# Patient Record
Sex: FEMALE | Race: WHITE | NOT HISPANIC OR LATINO | Employment: FULL TIME | URBAN - METROPOLITAN AREA
[De-identification: names, ages, dates, MRNs, and addresses within clinical notes are randomized per-mention and may not be internally consistent; named-entity substitution may affect disease eponyms.]

---

## 2018-06-12 ENCOUNTER — CONSULT (OUTPATIENT)
Dept: CARDIOLOGY | Facility: CLINIC | Age: 53
End: 2018-06-12

## 2018-06-12 VITALS
WEIGHT: 203.2 LBS | HEART RATE: 72 BPM | BODY MASS INDEX: 38.36 KG/M2 | HEIGHT: 61 IN | DIASTOLIC BLOOD PRESSURE: 76 MMHG | SYSTOLIC BLOOD PRESSURE: 110 MMHG

## 2018-06-12 DIAGNOSIS — R07.89 OTHER CHEST PAIN: Primary | ICD-10-CM

## 2018-06-12 DIAGNOSIS — I10 ESSENTIAL HYPERTENSION: ICD-10-CM

## 2018-06-12 DIAGNOSIS — Z82.49 FAMILY HISTORY OF EARLY CAD: ICD-10-CM

## 2018-06-12 PROBLEM — R07.9 CHEST PAIN: Status: ACTIVE | Noted: 2018-06-12

## 2018-06-12 PROCEDURE — 99244 OFF/OP CNSLTJ NEW/EST MOD 40: CPT | Performed by: INTERNAL MEDICINE

## 2018-06-12 PROCEDURE — 93000 ELECTROCARDIOGRAM COMPLETE: CPT | Performed by: INTERNAL MEDICINE

## 2018-06-12 RX ORDER — ASPIRIN 325 MG
TABLET ORAL DAILY
COMMUNITY
Start: 2018-05-07 | End: 2022-02-09

## 2018-06-12 RX ORDER — LEVOTHYROXINE SODIUM 0.07 MG/1
75 TABLET ORAL DAILY
COMMUNITY

## 2018-06-12 RX ORDER — NITROGLYCERIN 0.4 MG/1
TABLET SUBLINGUAL AS NEEDED
COMMUNITY
Start: 2018-05-07 | End: 2019-06-28 | Stop reason: SDUPTHER

## 2018-06-12 RX ORDER — FLUOXETINE HYDROCHLORIDE 40 MG/1
40 CAPSULE ORAL DAILY
COMMUNITY
Start: 2018-06-05 | End: 2022-02-09

## 2018-06-12 RX ORDER — LISINOPRIL AND HYDROCHLOROTHIAZIDE 12.5; 1 MG/1; MG/1
TABLET ORAL DAILY
COMMUNITY
Start: 2018-06-05 | End: 2019-06-28

## 2018-06-12 NOTE — ASSESSMENT & PLAN NOTE
Patient's symptoms are atypical in nature in that they occur sporadically and without exertion. The fact that they do improve with nitroglycerin raises the possibility of obstructive disease, coronary vasospasm, or esophageal spasm. We discussed her stress test results. She had a small defect in the anterior apical wall which I told her may represent a small territory of ischemia and her myocardium, but also may represent breast attenuation given its location. I have offered the patient heart catheterization for definitive assessment versus continued monitoring and treatment with low-dose aspirin and as needed nitroglycerin. The patient would prefer to defer cardiac catheterization which I think is reasonable.

## 2019-06-25 ENCOUNTER — TELEPHONE (OUTPATIENT)
Dept: CARDIOLOGY | Facility: CLINIC | Age: 54
End: 2019-06-25

## 2019-06-25 NOTE — TELEPHONE ENCOUNTER
Patient called scheduling to report she was at [facility] ER yesterday for chest pain but left AMA. I requested these records. Last seen 6/2018. Will work in clinic this week.

## 2019-06-27 NOTE — PROGRESS NOTES
Encounter Date:06/28/2019    Patient ID: Umu Aburto is a 54 y.o. female who is  and resides in [place], [place]    CC/Reason for visit:  Chest Pain and Abnormal ECG           Problem List Items Addressed This Visit        Cardiovascular and Mediastinum    Angina pectoris (CMS/HCC) - Primary    Overview     · [facility] admission for chest pain with negative biomarkers, 5/6/doesn't 18  · Exercise nuclear stress (5/7/18): Exercise for 5 minutes. Mild to moderate partially reversible mid anterior and apical wall defect. LVEF 63%  · Echo (5/7/18): LVEF 60%. Mild LVH. No significant valvular abnormality  · Presentation to [facility] 6/24/2019 with chest pain and ruled out for AMI/ACS/PE with negative biomarkers, negative CT and nonspecific EKG             Current Assessment & Plan     · Schedule myocardial perfusion study at [facility] with follow-up scheduled on same day  · Continue aspirin 81 mg daily  · Sublingual nitroglycerin for any episodes of angina         Relevant Medications    nitroglycerin (NITROSTAT) 0.4 MG SL tablet    Other Relevant Orders    Stress Test With Myocardial Perfusion (1 Day)    Essential hypertension    Current Assessment & Plan     · Hypertension is controlled  · Continue losartan/hydrochlorthiazide 50/12.5 mg 1 tablet daily         Relevant Medications    losartan-hydrochlorothiazide (HYZAAR) 50-12.5 MG per tablet       Other    Family history of early CAD    Overview     · Calculated 10 year risk of cardiovascular events is <2%         Current Assessment & Plan     · Continue aspirin 81 mg daily  · Defer statin therapy for now             The patient and I discussed proceeding with a cardiac catheterization considering her last stress test showed possible reversible ischemia versus breast attenuation however she prefers a non-invasive conservative route therefore we will schedule a myocardial perfusion study at [facility].  I will send in  sublingual nitroglycerin for her to use.  Her symptoms have typical and atypical features.  I am more concerned about her increased shortness of breath as her dull chest ache episode is very atypical for cardiac.       · Schedule myocardial perfusion study in 2 to 3 weeks at [facility] with follow-up scheduled on same day  · Patient to go to the ER for chest pain unrelieved with nitroglycerin  · If patient develops worsening symptoms prior to her stress test she is to contact us and we can consider proceeding directly to cardiac catheterization  Return in about 3 weeks (around 7/19/2019), or if symptoms worsen or fail to improve.            Umu Aburto returns today for follow-up of her chest pain and cardiac risk factors.  Patient was seen in consultation 1 year ago for evaluation of atypical chest pain.  She underwent a stress test that showed possible reversible ischemia versus breast attenuation.  A cardiac catheterization was discussed at that time but the patient preferred to defer the procedure and be treated medically which was felt to be reasonable as her symptoms were atypical at the time.  Since then the patient was actually at [facility] emergency room 4 days ago with complaints of chest pain.  She had used 3 nitroglycerin prior to her presentation but did not have any relief.  Was ruled out for AMI/ACS with negative biomarkers and EKG with nonspecific ST changes. She underwent a CT of the chest that was negative for pulmonary embolism.  They recommended she be hospitalized due to a high heart score but she declined hospitalization preferred to follow-up in our office.  The patient is an  at [employer] and while working she had sudden onset of substernal dull chest ache.  She had no other associated symptoms other than it seemed to worsen when she took in a deep breath.  She used sublingual nitroglycerin x3 but her symptoms did not improve.  Since  "discharge from the hospital she has had no further symptoms.  She has noticed over the past few months having intermittent shortness of breath particularly when walking up the stairs in her home.  She denies orthopnea, palpitations, presyncope, syncope, diaphoresis, nausea or vomiting.  She is requesting refill of her nitroglycerin.    Review of Systems   Constitution: Negative for weakness and malaise/fatigue.   Eyes: Negative for vision loss in left eye and vision loss in right eye.   Cardiovascular: Negative for chest pain, dyspnea on exertion, near-syncope, orthopnea, palpitations, paroxysmal nocturnal dyspnea and syncope.   Musculoskeletal: Negative for myalgias.   Neurological: Negative for brief paralysis, excessive daytime sleepiness, focal weakness, numbness and paresthesias.   All other systems reviewed and are negative.      The patient's past medical, social, family history and ROS reviewed in the patient's electronic medical record.    Allergies  Patient has no known allergies.    Outpatient Medications Marked as Taking for the 6/28/19 encounter (Office Visit) with Estephanie Durham APRN   Medication Sig Dispense Refill   • amitriptyline (ELAVIL) 25 MG tablet Every Night.     • CODY LOW DOSE 81 MG EC tablet Daily.     • busPIRone (BUSPAR) 10 MG tablet 3 (Three) Times a Day.     • dicyclomine (BENTYL) 20 MG tablet As Needed.  3   • FLUoxetine (PROzac) 40 MG capsule 20 mg Daily.     • hydrOXYzine Pamoate (VISTARIL PO) Take  by mouth Daily.     • levothyroxine (SYNTHROID, LEVOTHROID) 75 MCG tablet Take 75 mcg by mouth Daily.     • losartan-hydrochlorothiazide (HYZAAR) 50-12.5 MG per tablet Daily.     • nitroglycerin (NITROSTAT) 0.4 MG SL tablet Place 1 tablet under the tongue Every 5 (Five) Minutes As Needed (as need for chest pain). 60 tablet 1   • [DISCONTINUED] nitroglycerin (NITROSTAT) 0.4 MG SL tablet As Needed.             Blood pressure 110/64, pulse 81, height 154.9 cm (61\"), weight 104 kg (228 " lb 12.8 oz).  Body mass index is 43.23 kg/m².  There were no vitals filed for this visit.    Physical Exam   Constitutional: She is oriented to person, place, and time. She appears well-developed and well-nourished.   HENT:   Head: Normocephalic and atraumatic.   Eyes: Pupils are equal, round, and reactive to light. No scleral icterus.   Neck: No JVD present. Carotid bruit is not present. No thyromegaly present.   Cardiovascular: Normal rate and regular rhythm. Exam reveals no gallop.   No murmur heard.  Pulmonary/Chest: Effort normal and breath sounds normal.   Abdominal: Soft. She exhibits no distension. There is no hepatosplenomegaly.   Musculoskeletal: She exhibits no edema.   Neurological: She is alert and oriented to person, place, and time.   Skin: Skin is warm and dry.   Psychiatric: She has a normal mood and affect. Her behavior is normal.       Data Review (reviewed with patient):     Procedures    No results found for: CHOL, TRIG, HDL, LDL, AST, ALT    No results found for: HGBA1C        JILL Christian  6/28/2019

## 2019-06-28 ENCOUNTER — OFFICE VISIT (OUTPATIENT)
Dept: CARDIOLOGY | Facility: CLINIC | Age: 54
End: 2019-06-28

## 2019-06-28 VITALS
BODY MASS INDEX: 43.2 KG/M2 | SYSTOLIC BLOOD PRESSURE: 110 MMHG | HEART RATE: 81 BPM | HEIGHT: 61 IN | WEIGHT: 228.8 LBS | DIASTOLIC BLOOD PRESSURE: 64 MMHG

## 2019-06-28 DIAGNOSIS — Z82.49 FAMILY HISTORY OF EARLY CAD: ICD-10-CM

## 2019-06-28 DIAGNOSIS — I10 ESSENTIAL HYPERTENSION: ICD-10-CM

## 2019-06-28 DIAGNOSIS — I20.9 ANGINA PECTORIS (HCC): Primary | ICD-10-CM

## 2019-06-28 PROCEDURE — 99214 OFFICE O/P EST MOD 30 MIN: CPT | Performed by: NURSE PRACTITIONER

## 2019-06-28 RX ORDER — DICYCLOMINE HCL 20 MG
TABLET ORAL AS NEEDED
Refills: 3 | COMMUNITY
Start: 2019-04-01 | End: 2021-03-12 | Stop reason: SDUPTHER

## 2019-06-28 RX ORDER — BUSPIRONE HYDROCHLORIDE 10 MG/1
TABLET ORAL 3 TIMES DAILY
COMMUNITY
Start: 2019-06-21 | End: 2022-02-09 | Stop reason: ALTCHOICE

## 2019-06-28 RX ORDER — AMITRIPTYLINE HYDROCHLORIDE 25 MG/1
50 TABLET, FILM COATED ORAL NIGHTLY
COMMUNITY
Start: 2019-04-10 | End: 2020-07-22

## 2019-06-28 RX ORDER — NITROGLYCERIN 0.4 MG/1
0.4 TABLET SUBLINGUAL
Qty: 60 TABLET | Refills: 1 | Status: SHIPPED | OUTPATIENT
Start: 2019-06-28 | End: 2022-02-09 | Stop reason: SDUPTHER

## 2019-06-28 RX ORDER — LOSARTAN POTASSIUM AND HYDROCHLOROTHIAZIDE 12.5; 5 MG/1; MG/1
TABLET ORAL DAILY
COMMUNITY
Start: 2019-06-21 | End: 2019-07-19

## 2019-06-28 NOTE — ASSESSMENT & PLAN NOTE
· Schedule myocardial perfusion study at [facility] with follow-up scheduled on same day  · Continue aspirin 81 mg daily  · Sublingual nitroglycerin for any episodes of angina

## 2019-07-19 ENCOUNTER — HOSPITAL ENCOUNTER (OUTPATIENT)
Dept: CARDIOLOGY | Facility: HOSPITAL | Age: 54
Discharge: HOME | End: 2019-07-19

## 2019-07-19 ENCOUNTER — OFFICE VISIT (OUTPATIENT)
Dept: CARDIOLOGY | Facility: CLINIC | Age: 54
End: 2019-07-19

## 2019-07-19 VITALS
BODY MASS INDEX: 43.92 KG/M2 | HEIGHT: 61 IN | DIASTOLIC BLOOD PRESSURE: 91 MMHG | SYSTOLIC BLOOD PRESSURE: 131 MMHG | WEIGHT: 232.6 LBS | HEART RATE: 94 BPM

## 2019-07-19 VITALS — HEIGHT: 61 IN | WEIGHT: 228 LBS | BODY MASS INDEX: 43.05 KG/M2

## 2019-07-19 DIAGNOSIS — I10 ESSENTIAL HYPERTENSION: ICD-10-CM

## 2019-07-19 DIAGNOSIS — I20.9 ANGINA PECTORIS (HCC): ICD-10-CM

## 2019-07-19 DIAGNOSIS — Z82.49 FAMILY HISTORY OF EARLY CAD: ICD-10-CM

## 2019-07-19 DIAGNOSIS — I20.9 ANGINA PECTORIS (HCC): Primary | ICD-10-CM

## 2019-07-19 DIAGNOSIS — R06.09 DYSPNEA ON EXERTION: ICD-10-CM

## 2019-07-19 PROCEDURE — 99214 OFFICE O/P EST MOD 30 MIN: CPT | Performed by: NURSE PRACTITIONER

## 2019-07-19 PROCEDURE — A9500 TC99M SESTAMIBI: HCPCS | Performed by: INTERNAL MEDICINE

## 2019-07-19 PROCEDURE — 78452 HT MUSCLE IMAGE SPECT MULT: CPT

## 2019-07-19 PROCEDURE — 78452 HT MUSCLE IMAGE SPECT MULT: CPT | Performed by: INTERNAL MEDICINE

## 2019-07-19 PROCEDURE — 93018 CV STRESS TEST I&R ONLY: CPT | Performed by: INTERNAL MEDICINE

## 2019-07-19 PROCEDURE — 0 TECHNETIUM SESTAMIBI: Performed by: INTERNAL MEDICINE

## 2019-07-19 PROCEDURE — 93017 CV STRESS TEST TRACING ONLY: CPT

## 2019-07-19 RX ORDER — OMEPRAZOLE 40 MG/1
40 CAPSULE, DELAYED RELEASE ORAL DAILY
COMMUNITY
End: 2022-02-09

## 2019-07-19 RX ORDER — LOSARTAN POTASSIUM AND HYDROCHLOROTHIAZIDE 12.5; 5 MG/1; MG/1
1 TABLET ORAL 2 TIMES DAILY
Qty: 90 TABLET | Refills: 3 | Status: SHIPPED | OUTPATIENT
Start: 2019-07-19 | End: 2020-01-30 | Stop reason: SDUPTHER

## 2019-07-19 RX ADMIN — TECHNETIUM TC 99M SESTAMIBI 1 DOSE: 1 INJECTION INTRAVENOUS at 09:50

## 2019-07-19 RX ADMIN — TECHNETIUM TC 99M SESTAMIBI 1 DOSE: 1 INJECTION INTRAVENOUS at 07:45

## 2019-07-19 NOTE — ASSESSMENT & PLAN NOTE
· NYHA class III symptoms likely related to 60 pound weight gain and deconditioning  · Recommend weight loss and increase physical activity.  If dyspnea does not improve with this she can contact us

## 2019-07-19 NOTE — ASSESSMENT & PLAN NOTE
· Hypertension is not well controlled  · Response during stress testing  · Increase losartan/hydrochlorothiazide from daily to twice daily   · Repeat BMP in 1 week

## 2019-07-19 NOTE — PROGRESS NOTES
Encounter Date:07/19/2019    Patient ID: Umu Aburto is a marrried 54 y.o. female who resides in [place], [place]. She is employed at [employer].    CC/Reason for visit:  Chest Pain (follow up with stress test) and Hypertension           Problem List Items Addressed This Visit        Cardiovascular and Mediastinum    Angina pectoris (CMS/HCC) - Primary    Overview     · [facility] admission for chest pain with negative biomarkers, 5/6/doesn't 18  · Exercise nuclear stress (5/7/18): Exercise for 5 minutes. Mild to moderate partially reversible mid anterior and apical wall defect. LVEF 63%  · Echo (5/7/18): LVEF 60%. Mild LVH. No significant valvular abnormality  · Presentation to [facility] 6/24/2019 with chest pain and ruled out for AMI/ACS/PE with negative biomarkers, negative CT and nonspecific EKG  · MPS (7/19/2019): No ischemia.  Low risk study             Current Assessment & Plan     · Patient is not experience any further chest pain  · Can discontinue aspirin         Essential hypertension    Current Assessment & Plan     · Hypertension is not well controlled  · Response during stress testing  · Increase losartan/hydrochlorothiazide from daily to twice daily   · Repeat BMP in 1 week         Relevant Medications    losartan-hydrochlorothiazide (HYZAAR) 50-12.5 MG per tablet    Other Relevant Orders    Basic Metabolic Panel       Respiratory    Dyspnea on exertion    Current Assessment & Plan     · NYHA class III symptoms likely related to 60 pound weight gain and deconditioning  · Recommend weight loss and increase physical activity.  If dyspnea does not improve with this she can contact us            Other    Family history of early CAD    Overview     · Calculated 10 year risk of cardiovascular events is <2%             The patient's stress test was normal and low risk.  I instructed her to discontinue her aspirin.  In regards to her dyspnea on exertion it is  likely related to her weight gain and physical deconditioning as she had a normal echo last year.  We discussed strategies for weight loss including eating a 1800-calorie consistent low carbohydrate daily and increasing physical activity to 30 minutes a day.  If shortness of breath persist despite weight loss and increasing physical activity she is to contact us.  Her blood pressure has also not been well controlled and she had a hypertensive response to stress therefore I will increase her losartan/hydrochlorothiazide 200 mg / 25 mg daily.  She will need a BMP in 1 week.  Follow-up 1 year or sooner if needed       · Increase losartan/hydrochlorothiazide 50 mg / 12.5 mg twice daily  · BMP in 1 week  · Increase physical activity 30 minutes most days of the week  · 1800-calorie restricted consistent low carbohydrate diet for weight loss  · If weight loss and increase physical activity does not improve dyspnea patient can contact us  · Lab results reviewed with patient  Return in about 1 year (around 7/19/2020), or if symptoms worsen or fail to improve.          Umu Aburto returns to my office today for follow-up of her angina and cardiac risk factors, including hypertension and family history of early CAD.  At her last visit the patient had reported chest discomfort that had typical and atypical features and shortness of breath.  Myocardial perfusion study was ordered which she had earlier today.  Her results were normal without evidence of ischemia.  The patient has not had any further chest pain but still experiences dyspnea on exertion.  Whenever she climbs the stairs in her home she is very winded by the time she gets of the top.  She often has to sit on her bed for a few minutes.  She reports having gained 60 pounds over the past year and particularly 20 pounds in the past 2 months she is around the same time she has noticed her breathing has worsened.  She also admits she is not very active and does no real  "forms of physical activity.  Her  reports they have both gained weight from going to [employer] daily and eating blizzards.    Review of Systems   Constitution: Positive for malaise/fatigue and weight gain.   Cardiovascular: Positive for leg swelling.   Respiratory: Positive for shortness of breath and snoring.    Endocrine: Positive for cold intolerance and polydipsia.   Skin: Positive for dry skin.   Musculoskeletal: Positive for arthritis, neck pain and stiffness.   Gastrointestinal: Positive for constipation and hematochezia.       The patient's past medical, social, family history and ROS reviewed in the patient's electronic medical record.    Allergies  Patient has no known allergies.    Outpatient Medications Marked as Taking for the 7/19/19 encounter (Office Visit) with Arya Rutledge IV, MD   Medication Sig Dispense Refill   • amitriptyline (ELAVIL) 25 MG tablet 50 mg Every Night.     • B Complex Vitamins (VITAMIN B COMPLEX PO) Take  by mouth Daily.     • CODY LOW DOSE 81 MG EC tablet Daily.     • busPIRone (BUSPAR) 10 MG tablet 3 (Three) Times a Day.     • dicyclomine (BENTYL) 20 MG tablet As Needed.  3   • FLUoxetine (PROzac) 40 MG capsule 20 mg Daily.     • levothyroxine (SYNTHROID, LEVOTHROID) 75 MCG tablet Take 75 mcg by mouth Daily.     • losartan-hydrochlorothiazide (HYZAAR) 50-12.5 MG per tablet Take 1 tablet by mouth 2 (Two) Times a Day. 90 tablet 3   • Multiple Vitamins-Minerals (VITAMIN D3 COMPLETE PO) Take  by mouth Daily.     • omeprazole (priLOSEC) 40 MG capsule Take 40 mg by mouth Daily.     • [DISCONTINUED] losartan-hydrochlorothiazide (HYZAAR) 50-12.5 MG per tablet Daily.             Blood pressure 131/91, pulse 94, height 154.9 cm (61\"), weight 106 kg (232 lb 9.6 oz).  Body mass index is 43.95 kg/m².  Vitals:    07/19/19 1211   Patient Position: Sitting       Physical Exam   Constitutional: She is oriented to person, place, and time. She appears well-developed and " well-nourished.   HENT:   Head: Normocephalic and atraumatic.   Eyes: Pupils are equal, round, and reactive to light. No scleral icterus.   Neck: No JVD present. Carotid bruit is not present. No thyromegaly present.   Cardiovascular: Normal rate and regular rhythm. Exam reveals no gallop.   No murmur heard.  Pulmonary/Chest: Effort normal and breath sounds normal.   Abdominal: Soft. She exhibits no distension. There is no hepatosplenomegaly.   Musculoskeletal: She exhibits no edema.   Neurological: She is alert and oriented to person, place, and time.   Skin: Skin is warm and dry.   Psychiatric: She has a normal mood and affect. Her behavior is normal.       Data Review:     Procedures    Last labs, 06/24/2019:  · CBC: WBC 11.5, RBC 4.39, Hgb 13.7, Hct 40.6, MCV 92.4, MCH 31.2, RDW 12.9, Plt 386, MPV 9.3  · CMP (6/24/2019): Na 137, K 3.8, Cl 98, CO2 27, BUN 16.2, crat 0.8, eGFR > 59, Glucose 157, Ca 9.1, AST 33, ALT 65, TP 7.3, Alb 4.0, Alk Phos 114l          JILL Christian

## 2019-07-25 LAB
BH CV NUCLEAR PRIOR STUDY: 1
BH CV STRESS BP STAGE 1: NORMAL
BH CV STRESS BP STAGE 2: NORMAL
BH CV STRESS DURATION MIN STAGE 1: 3
BH CV STRESS DURATION MIN STAGE 2: 2
BH CV STRESS DURATION SEC STAGE 1: 0
BH CV STRESS DURATION SEC STAGE 2: 0
BH CV STRESS GRADE STAGE 1: 10
BH CV STRESS GRADE STAGE 2: 12
BH CV STRESS HR STAGE 1: 143
BH CV STRESS HR STAGE 2: 158
BH CV STRESS METS STAGE 1: 5
BH CV STRESS METS STAGE 2: 6.4
BH CV STRESS O2 STAGE 1: 97
BH CV STRESS O2 STAGE 2: 98
BH CV STRESS PROTOCOL 1: NORMAL
BH CV STRESS RECOVERY BP: NORMAL MMHG
BH CV STRESS RECOVERY HR: 109 BPM
BH CV STRESS RECOVERY O2: 98 %
BH CV STRESS SPEED STAGE 1: 1.7
BH CV STRESS SPEED STAGE 2: 2.5
BH CV STRESS STAGE 1: 1
BH CV STRESS STAGE 2: 2
LV EF NUC BP: 67 %
MAXIMAL PREDICTED HEART RATE: 167 BPM
PERCENT MAX PREDICTED HR: 95.21 %
STRESS BASELINE BP: NORMAL MMHG
STRESS BASELINE HR: 94 BPM
STRESS O2 SAT REST: 97 %
STRESS PERCENT HR: 112 %
STRESS POST ESTIMATED WORKLOAD: 6.4 METS
STRESS POST EXERCISE DUR MIN: 5 MIN
STRESS POST EXERCISE DUR SEC: 0 SEC
STRESS POST O2 SAT PEAK: 95 %
STRESS POST PEAK BP: NORMAL MMHG
STRESS POST PEAK HR: 159 BPM
STRESS TARGET HR: 142 BPM

## 2019-08-14 ENCOUNTER — TELEPHONE (OUTPATIENT)
Dept: CARDIOLOGY | Facility: CLINIC | Age: 54
End: 2019-08-14

## 2019-08-14 NOTE — TELEPHONE ENCOUNTER
"Patient called to report that since her Losartan-HCTZ was increased she has been experiencing some leg cramps that feel like a \"dull ache\". Patient was due for recheck of BMP one week after initiation of new dose. Pt states that she got these drawn and will have the results fax to us. Patient advised that we will review labs and call with any recommendations. Patient is agreeable to plan.   "

## 2019-08-16 NOTE — TELEPHONE ENCOUNTER
"Labs scanned under \"Lab\" tab in chart.    I called patient today to check in on symptoms. Pt reports still having dull ache in legs. Patient denies any chest pain, edema, palpitations, SOA. Her BP is averaging 124/74 HR 91.       Her dose of Losartan-HCTZ 50mg-12.5 mg daily was increased the end of July to twice daily. She contributes her leg aches to this.       How would you like to proceed?  "

## 2019-08-16 NOTE — TELEPHONE ENCOUNTER
Patient contacted with recommendations per JWL. Pt advised to increase water intake and remain active as tolerated. Pt to call office if leg discomfort persists. Pt agreeable to plan, verbalizes understanding. All questions answered at this time.

## 2019-08-16 NOTE — TELEPHONE ENCOUNTER
Labs are normal, should not be the cause of her leg discomfort, but if it persists then would be reasonable to decrease back to previous dose and trend symptoms and BP.

## 2020-01-30 RX ORDER — LOSARTAN POTASSIUM 50 MG/1
50 TABLET ORAL 2 TIMES DAILY
Qty: 180 TABLET | Refills: 1 | Status: SHIPPED | OUTPATIENT
Start: 2020-01-30 | End: 2020-07-22 | Stop reason: SDUPTHER

## 2020-01-30 RX ORDER — HYDROCHLOROTHIAZIDE 12.5 MG/1
12.5 CAPSULE, GELATIN COATED ORAL 2 TIMES DAILY
Qty: 180 CAPSULE | Refills: 1 | Status: SHIPPED | OUTPATIENT
Start: 2020-01-30 | End: 2020-07-22 | Stop reason: SDUPTHER

## 2020-07-22 ENCOUNTER — OFFICE VISIT (OUTPATIENT)
Dept: CARDIOLOGY | Facility: CLINIC | Age: 55
End: 2020-07-22

## 2020-07-22 VITALS
HEIGHT: 61 IN | SYSTOLIC BLOOD PRESSURE: 126 MMHG | WEIGHT: 212.6 LBS | OXYGEN SATURATION: 98 % | TEMPERATURE: 96.9 F | DIASTOLIC BLOOD PRESSURE: 88 MMHG | BODY MASS INDEX: 40.14 KG/M2 | HEART RATE: 97 BPM

## 2020-07-22 DIAGNOSIS — Z82.49 FAMILY HISTORY OF EARLY CAD: ICD-10-CM

## 2020-07-22 DIAGNOSIS — I20.8 ATYPICAL ANGINA (HCC): Primary | ICD-10-CM

## 2020-07-22 DIAGNOSIS — I10 ESSENTIAL HYPERTENSION: ICD-10-CM

## 2020-07-22 PROBLEM — I20.89 ATYPICAL ANGINA: Status: ACTIVE | Noted: 2018-06-12

## 2020-07-22 PROCEDURE — 99213 OFFICE O/P EST LOW 20 MIN: CPT | Performed by: INTERNAL MEDICINE

## 2020-07-22 PROCEDURE — 93000 ELECTROCARDIOGRAM COMPLETE: CPT | Performed by: INTERNAL MEDICINE

## 2020-07-22 RX ORDER — CHOLECALCIFEROL (VITAMIN D3) 125 MCG
30 CAPSULE ORAL
COMMUNITY
End: 2022-02-09

## 2020-07-22 RX ORDER — OMEGA-3S/DHA/EPA/FISH OIL/D3 300MG-1000
400 CAPSULE ORAL DAILY
COMMUNITY

## 2020-07-22 RX ORDER — LOSARTAN POTASSIUM 100 MG/1
100 TABLET ORAL DAILY
Qty: 90 TABLET | Refills: 3 | Status: SHIPPED | OUTPATIENT
Start: 2020-07-22 | End: 2021-08-30

## 2020-07-22 RX ORDER — CYCLOBENZAPRINE HCL 10 MG
10 TABLET ORAL NIGHTLY
COMMUNITY
End: 2022-02-09 | Stop reason: ALTCHOICE

## 2020-07-22 RX ORDER — HYDROCHLOROTHIAZIDE 12.5 MG/1
25 CAPSULE, GELATIN COATED ORAL EVERY MORNING
Qty: 180 CAPSULE | Refills: 1 | Status: SHIPPED | OUTPATIENT
Start: 2020-07-22 | End: 2020-09-16

## 2020-07-22 NOTE — ASSESSMENT & PLAN NOTE
"· Patient concerned about family history of coronary artery disease but also does not want to take statin therapy unless \"needed\"  · Recommend repeat CMP and lipid  · Recommend coronary calcium score testing for advanced re-stratification  · If CAC >100, statin therapy indicated.  If CAC >1, statin therapy reasonable.  "

## 2020-07-22 NOTE — PROGRESS NOTES
[facility]  Office Visit Note    DATE: 07/22/2020    IDENTIFICATION: Umu Aburto is a  55 y.o. female who resides in [place], [place].    REASON FOR VISIT:  • Angina  • Hypertension             Umu Aburto returns to the office for 1 year follow-up.  Patient reports that she had an episode of chest pain recently while eating peanuts.  After washing a peanut down with soda, she had acute onset of midsternal severe chest pain which lasted for approximately 20 minutes and then resolved spontaneously.  Patient denies having chest pain symptoms when she physically exerts herself.    Review of Systems   Cardiovascular: Positive for chest pain.   All other systems reviewed and are negative.      The patient's past medical, social, family history and ROS reviewed in the patient's electronic medical record.    No Known Allergies      Current Outpatient Medications:   •  B Complex Vitamins (VITAMIN B COMPLEX PO), Take  by mouth Daily., Disp: , Rfl:   •  CODY LOW DOSE 81 MG EC tablet, Daily., Disp: , Rfl:   •  busPIRone (BUSPAR) 10 MG tablet, 3 (Three) Times a Day., Disp: , Rfl:   •  cholecalciferol (VITAMIN D3) 10 MCG (400 UNIT) tablet, Take 400 Units by mouth Daily., Disp: , Rfl:   •  cyclobenzaprine (FLEXERIL) 10 MG tablet, Take 10 mg by mouth Every Night., Disp: , Rfl:   •  DICLOFENAC PO, Take 75 mg by mouth 2 (Two) Times a Day., Disp: , Rfl:   •  dicyclomine (BENTYL) 20 MG tablet, As Needed., Disp: , Rfl: 3  •  FLUoxetine (PROzac) 40 MG capsule, 40 mg Daily., Disp: , Rfl:   •  hydroCHLOROthiazide (MICROZIDE) 12.5 MG capsule, Take 2 capsules by mouth Every Morning., Disp: 180 capsule, Rfl: 1  •  levothyroxine (SYNTHROID, LEVOTHROID) 75 MCG tablet, Take 75 mcg by mouth Daily., Disp: , Rfl:   •  losartan (COZAAR) 100 MG tablet, Take 1 tablet by mouth Daily., Disp: 90 tablet, Rfl: 3  •  melatonin 5 MG tablet tablet, Take 30 mg by mouth. 3 tabs at night, Disp: , Rfl:   •  metFORMIN  "(GLUCOPHAGE) 500 MG tablet, Take 500 mg by mouth 2 (Two) Times a Day With Meals., Disp: , Rfl:   •  nitroglycerin (NITROSTAT) 0.4 MG SL tablet, Place 1 tablet under the tongue Every 5 (Five) Minutes As Needed (as need for chest pain)., Disp: 60 tablet, Rfl: 1  •  omeprazole (priLOSEC) 40 MG capsule, Take 40 mg by mouth Daily., Disp: , Rfl:     Past Medical History:   Diagnosis Date   • Anxiety    • Arthritis    • Colitis    • Depression    • Hypertension    • Meningitis    • Thyroid disorder        Past Surgical History:   Procedure Laterality Date   •  SECTION     • CHOLECYSTECTOMY     • COLONOSCOPY W/ POLYPECTOMY         Family History   Problem Relation Age of Onset   • Parkinsonism Mother    • Pneumonia Mother    • Heart disease Father    • Heart murmur Sister    • Diabetes Brother    • Heart disease Brother    • Heart disease Brother        Social History     Tobacco Use   • Smoking status: Never Smoker   • Smokeless tobacco: Never Used   Substance Use Topics   • Alcohol use: No           Blood pressure 126/88, pulse 97, temperature 96.9 °F (36.1 °C), height 154.9 cm (61\"), weight 96.4 kg (212 lb 9.6 oz), SpO2 98 %.  Body mass index is 40.17 kg/m².  Vitals:    20 1318   Patient Position: Sitting       Physical Exam   Constitutional: She is oriented to person, place, and time. She appears well-developed and well-nourished.   HENT:   Head: Normocephalic and atraumatic.   Eyes: Pupils are equal, round, and reactive to light. No scleral icterus.   Neck: No JVD present. Carotid bruit is not present. No thyromegaly present.   Cardiovascular: Normal rate, regular rhythm, S1 normal and S2 normal. Exam reveals no gallop.   No murmur heard.  Pulmonary/Chest: Effort normal and breath sounds normal.   Abdominal: Soft. She exhibits no mass. There is no hepatosplenomegaly. There is no tenderness.   Neurological: She is alert and oriented to person, place, and time.   Skin: Skin is warm and dry. No cyanosis. " Nails show no clubbing.   Psychiatric: She has a normal mood and affect. Her behavior is normal.       Data Review (reviewed with patient):       ECG 12 Lead  Date/Time: 7/22/2020 1:33 PM  Performed by: Arya Rutledge IV, MD  Authorized by: Arya Rutledge IV, MD   Comparison: compared with previous ECG from 6/12/2018  Similar to previous ECG  Rhythm: sinus rhythm  BPM: 95    Clinical impression: normal ECG                   Problem List Items Addressed This Visit        Cardiology Problems    Atypical angina (CMS/HCC) - Primary    Overview     · [facility] admission for chest pain with negative biomarkers, 5/6/2018  · Exercise nuclear stress (5/7/18): Exercise for 5 minutes. Mild to moderate partially reversible mid anterior and apical wall defect. LVEF 63%  · Echo (5/7/18): LVEF 60%. Mild LVH. No significant valvular abnormality  · Presentation to [facility] 6/24/2019 with chest pain and ruled out for AMI/ACS/PE with negative biomarkers, negative CT and nonspecific EKG  · Nuclear stress test (7/19/2019): No ischemia/infarct.         Current Assessment & Plan     · Recent nonexertional chest pain symptoms are atypical and unlikely related to obstructive CAD  · Recent stress testing within normal limits  · Reassured patient this episode likely GI in nature         Relevant Orders    ECG 12 Lead    CBC (No Diff)    Essential hypertension    Overview     • Target blood pressure <130/80 mmHg         Current Assessment & Plan     · Controlled  · Continue present medical therapy         Relevant Medications    losartan (COZAAR) 100 MG tablet    hydroCHLOROthiazide (MICROZIDE) 12.5 MG capsule       Other    Family history of early CAD    Overview     · Dad with CABG at 50  · Calculated 10 year risk of cardiovascular events is <2%         Current Assessment & Plan     · Patient concerned about family history of coronary artery disease but also does not want to take statin  "therapy unless \"needed\"  · Recommend repeat CMP and lipid  · Recommend coronary calcium score testing for advanced re-stratification  · If CAC >100, statin therapy indicated.  If CAC >1, statin therapy reasonable.         Relevant Orders    CT Cardiac Calcium Score Without Dye    Comprehensive Metabolic Panel    Lipid Panel               · Coronary calcium score testing for advanced risk stratification  · If CAC >100, will recommend statin therapy  Return in about 1 year (around 7/22/2021).      OLGA Rutledge MD [facility], [facility]  Interventional and General Cardiology      7/22/2020    "

## 2020-07-22 NOTE — ASSESSMENT & PLAN NOTE
· Recent nonexertional chest pain symptoms are atypical and unlikely related to obstructive CAD  · Recent stress testing within normal limits  · Reassured patient this episode likely GI in nature

## 2020-07-23 LAB
ALBUMIN SERPL-MCNC: 4.7 G/DL (ref 3.5–5.2)
ALBUMIN/GLOB SERPL: 1.5 G/DL
ALP SERPL-CCNC: 114 U/L (ref 39–117)
ALT SERPL-CCNC: 30 U/L (ref 1–33)
AST SERPL-CCNC: 19 U/L (ref 1–32)
BILIRUB SERPL-MCNC: 0.3 MG/DL (ref 0–1.2)
BUN SERPL-MCNC: 11 MG/DL (ref 6–20)
BUN/CREAT SERPL: 14.3 (ref 7–25)
CALCIUM SERPL-MCNC: 10.3 MG/DL (ref 8.6–10.5)
CHLORIDE SERPL-SCNC: 98 MMOL/L (ref 98–107)
CHOLEST SERPL-MCNC: 214 MG/DL (ref 0–200)
CO2 SERPL-SCNC: 30.5 MMOL/L (ref 22–29)
CREAT SERPL-MCNC: 0.77 MG/DL (ref 0.57–1)
ERYTHROCYTE [DISTWIDTH] IN BLOOD BY AUTOMATED COUNT: 12.1 % (ref 12.3–15.4)
GLOBULIN SER CALC-MCNC: 3.2 GM/DL
GLUCOSE SERPL-MCNC: 94 MG/DL (ref 65–99)
HCT VFR BLD AUTO: 42.6 % (ref 34–46.6)
HDLC SERPL-MCNC: 53 MG/DL (ref 40–60)
HGB BLD-MCNC: 14.4 G/DL (ref 12–15.9)
LDLC SERPL CALC-MCNC: 131 MG/DL (ref 0–100)
MCH RBC QN AUTO: 31.4 PG (ref 26.6–33)
MCHC RBC AUTO-ENTMCNC: 33.8 G/DL (ref 31.5–35.7)
MCV RBC AUTO: 92.8 FL (ref 79–97)
PLATELET # BLD AUTO: 455 10*3/MM3 (ref 140–450)
POTASSIUM SERPL-SCNC: 4.4 MMOL/L (ref 3.5–5.2)
PROT SERPL-MCNC: 7.9 G/DL (ref 6–8.5)
RBC # BLD AUTO: 4.59 10*6/MM3 (ref 3.77–5.28)
SODIUM SERPL-SCNC: 142 MMOL/L (ref 136–145)
TRIGL SERPL-MCNC: 151 MG/DL (ref 0–150)
VLDLC SERPL CALC-MCNC: 30.2 MG/DL
WBC # BLD AUTO: 8.95 10*3/MM3 (ref 3.4–10.8)

## 2020-08-18 ENCOUNTER — TELEPHONE (OUTPATIENT)
Dept: CARDIOLOGY | Facility: CLINIC | Age: 55
End: 2020-08-18

## 2020-08-18 RX ORDER — METOPROLOL TARTRATE 50 MG/1
50 TABLET, FILM COATED ORAL 2 TIMES DAILY
Qty: 60 TABLET | Refills: 0 | Status: SHIPPED | OUTPATIENT
Start: 2020-08-18 | End: 2020-12-15 | Stop reason: SDUPTHER

## 2020-08-18 NOTE — TELEPHONE ENCOUNTER
Patient called to report that she attempted to get her coronary calcium test today but they were unable to preform d/t her HR not going below 90. She is wondering is she could go on a beta blocker for at least a couple of days so she could obtain testing?

## 2020-09-11 ENCOUNTER — TELEPHONE (OUTPATIENT)
Dept: CARDIOLOGY | Facility: CLINIC | Age: 55
End: 2020-09-11

## 2020-09-11 DIAGNOSIS — E78.5 HYPERLIPIDEMIA LDL GOAL <70: Primary | ICD-10-CM

## 2020-09-11 RX ORDER — ATORVASTATIN CALCIUM 20 MG/1
20 TABLET, FILM COATED ORAL DAILY
Qty: 90 TABLET | Refills: 0 | Status: SHIPPED | OUTPATIENT
Start: 2020-09-11 | End: 2020-09-16 | Stop reason: SDUPTHER

## 2020-09-16 DIAGNOSIS — I10 ESSENTIAL HYPERTENSION: ICD-10-CM

## 2020-09-16 DIAGNOSIS — E78.5 HYPERLIPIDEMIA LDL GOAL <100: Primary | ICD-10-CM

## 2020-09-16 RX ORDER — ATORVASTATIN CALCIUM 20 MG/1
20 TABLET, FILM COATED ORAL DAILY
Qty: 90 TABLET | Refills: 1 | Status: SHIPPED | OUTPATIENT
Start: 2020-09-16 | End: 2020-12-15 | Stop reason: SDUPTHER

## 2020-09-16 RX ORDER — HYDROCHLOROTHIAZIDE 25 MG/1
25 TABLET ORAL DAILY
Qty: 90 TABLET | Refills: 3 | Status: SHIPPED | OUTPATIENT
Start: 2020-09-16 | End: 2021-09-30 | Stop reason: SDUPTHER

## 2020-12-15 PROBLEM — E78.5 HYPERLIPIDEMIA LDL GOAL <100: Status: ACTIVE | Noted: 2020-12-15

## 2020-12-15 PROBLEM — I25.119 CORONARY ARTERY DISEASE INVOLVING NATIVE CORONARY ARTERY OF NATIVE HEART WITH ANGINA PECTORIS: Status: ACTIVE | Noted: 2018-06-12

## 2021-01-12 ENCOUNTER — TELEPHONE (OUTPATIENT)
Dept: CARDIOLOGY | Facility: CLINIC | Age: 56
End: 2021-01-12

## 2021-01-12 DIAGNOSIS — E78.5 HYPERLIPIDEMIA LDL GOAL <100: ICD-10-CM

## 2021-01-12 RX ORDER — ATORVASTATIN CALCIUM 20 MG/1
20 TABLET, FILM COATED ORAL NIGHTLY
Qty: 90 TABLET | Refills: 3 | Status: SHIPPED | OUTPATIENT
Start: 2021-01-12 | End: 2022-02-09 | Stop reason: ALTCHOICE

## 2021-01-12 NOTE — ADDENDUM NOTE
Addended by: SHERRILL ARRIAZA IV on: 1/12/2021 02:46 PM     Modules accepted: Orders     [Alert] : alert [No Acute Distress] : no acute distress [Normocephalic] : normocephalic [Anterior [place] Closed] : anterior fontanelle closed [Red Reflex Bilateral] : red reflex bilateral [PERRL] : PERRL [Normally Placed Ears] : normally placed ears [Auricles Well Formed] : auricles well formed [Clear Tympanic membranes with present light reflex and bony landmarks] : clear tympanic membranes with present light reflex and bony landmarks [No Discharge] : no discharge [Nares Patent] : nares patent [Palate Intact] : palate intact [Uvula Midline] : uvula midline [Tooth Eruption] : tooth eruption  [Supple, full passive range of motion] : supple, full passive range of motion [No Palpable Masses] : no palpable masses [Symmetric Chest Rise] : symmetric chest rise [Clear to Ausculatation Bilaterally] : clear to auscultation bilaterally [Regular Rate and Rhythm] : regular rate and rhythm [S1, S2 present] : S1, S2 present [No Murmurs] : no murmurs [+2 Femoral Pulses] : +2 femoral pulses [Soft] : soft [NonTender] : non tender [Non Distended] : non distended [Normoactive Bowel Sounds] : normoactive bowel sounds [No Hepatomegaly] : no hepatomegaly [No Splenomegaly] : no splenomegaly [Juan 1] : Juan 1 [Central Urethral Opening] : central urethral opening [Testicles Descended Bilaterally] : testicles descended bilaterally [Patent] : patent [Normally Placed] : normally placed [No Abnormal Lymph Nodes Palpated] : no abnormal lymph nodes palpated [No Clavicular Crepitus] : no clavicular crepitus [Symmetric Buttocks Creases] : symmetric buttocks creases [No Spinal Dimple] : no spinal dimple [NoTuft of Hair] : no tuft of hair [Cranial Nerves Grossly Intact] : cranial nerves grossly intact [No Rash or Lesions] : no rash or lesions

## 2021-01-12 NOTE — TELEPHONE ENCOUNTER
Patient is unable to tolerate increase in Atorvastatin dose of 40 mg due to myalgias which the patient states is mainly at night. Patient previously tolerate Atorvastatin 20 mg.     Please advise.Thanks.     Lab Results   Component Value Date    CHLPL 214 (H) 07/22/2020    TRIG 151 (H) 07/22/2020    HDL 53 07/22/2020     (H) 07/22/2020     Lab Results   Component Value Date    BUN 11 07/22/2020    CREATININE 0.77 07/22/2020    EGFRIFNONA 78 07/22/2020    EGFRIFAFRI 95 07/22/2020    BCR 14.3 07/22/2020    K 4.4 07/22/2020    CO2 30.5 (H) 07/22/2020    CALCIUM 10.3 07/22/2020    PROTENTOTREF 7.9 07/22/2020    ALBUMIN 4.70 07/22/2020    LABIL2 1.5 07/22/2020    AST 19 07/22/2020    ALT 30 07/22/2020

## 2021-01-12 NOTE — TELEPHONE ENCOUNTER
I think that is just fine.  Other option would be to switch to rosuvastatin but with her CAC score of only 84, I do not think we need to get crazy.    OLGA Rutledge MD [facility], [facility]  Interventional and General Cardiology

## 2021-03-12 ENCOUNTER — OFFICE VISIT (OUTPATIENT)
Dept: OBSTETRICS AND GYNECOLOGY | Facility: CLINIC | Age: 56
End: 2021-03-12

## 2021-03-12 VITALS
SYSTOLIC BLOOD PRESSURE: 140 MMHG | BODY MASS INDEX: 42.67 KG/M2 | DIASTOLIC BLOOD PRESSURE: 82 MMHG | WEIGHT: 226 LBS | HEIGHT: 61 IN

## 2021-03-12 DIAGNOSIS — E66.01 MORBIDLY OBESE (HCC): ICD-10-CM

## 2021-03-12 DIAGNOSIS — Z01.419 WOMEN'S ANNUAL ROUTINE GYNECOLOGICAL EXAMINATION: Primary | ICD-10-CM

## 2021-03-12 DIAGNOSIS — N80.9 ENDOMETRIOSIS: ICD-10-CM

## 2021-03-12 PROCEDURE — 99213 OFFICE O/P EST LOW 20 MIN: CPT | Performed by: OBSTETRICS & GYNECOLOGY

## 2021-03-12 PROCEDURE — 99396 PREV VISIT EST AGE 40-64: CPT | Performed by: OBSTETRICS & GYNECOLOGY

## 2021-03-12 RX ORDER — DICYCLOMINE HCL 20 MG
20 TABLET ORAL AS NEEDED
Qty: 30 TABLET | Refills: 5 | Status: SHIPPED | OUTPATIENT
Start: 2021-03-12 | End: 2022-02-09

## 2021-03-17 DIAGNOSIS — Z01.419 WOMEN'S ANNUAL ROUTINE GYNECOLOGICAL EXAMINATION: ICD-10-CM

## 2021-04-02 ENCOUNTER — APPOINTMENT (OUTPATIENT)
Dept: OTHER | Facility: HOSPITAL | Age: 56
End: 2021-04-02

## 2021-04-02 ENCOUNTER — HOSPITAL ENCOUNTER (OUTPATIENT)
Dept: MAMMOGRAPHY | Facility: HOSPITAL | Age: 56
Discharge: HOME | End: 2021-04-02
Admitting: OBSTETRICS & GYNECOLOGY

## 2021-04-02 DIAGNOSIS — Z01.419 WOMEN'S ANNUAL ROUTINE GYNECOLOGICAL EXAMINATION: ICD-10-CM

## 2021-04-02 DIAGNOSIS — Z92.89 H/O MAMMOGRAM: ICD-10-CM

## 2021-04-02 PROCEDURE — 77067 SCR MAMMO BI INCL CAD: CPT

## 2021-04-02 PROCEDURE — 77063 BREAST TOMOSYNTHESIS BI: CPT

## 2021-04-09 ENCOUNTER — OFFICE VISIT (OUTPATIENT)
Dept: OBSTETRICS AND GYNECOLOGY | Facility: CLINIC | Age: 56
End: 2021-04-09

## 2021-04-09 VITALS
HEIGHT: 61 IN | BODY MASS INDEX: 41.12 KG/M2 | WEIGHT: 217.8 LBS | DIASTOLIC BLOOD PRESSURE: 84 MMHG | SYSTOLIC BLOOD PRESSURE: 132 MMHG

## 2021-04-09 DIAGNOSIS — N80.9 ENDOMETRIOSIS: ICD-10-CM

## 2021-04-09 PROCEDURE — 76830 TRANSVAGINAL US NON-OB: CPT | Performed by: OBSTETRICS & GYNECOLOGY

## 2021-04-09 PROCEDURE — 99213 OFFICE O/P EST LOW 20 MIN: CPT | Performed by: OBSTETRICS & GYNECOLOGY

## 2021-04-09 NOTE — PROGRESS NOTES
Chief Complaint   Patient presents with   • Gynecologic Exam   • Pelvic Pain     follow up       Subjective   HPI  Umu Aburto is a 55 y.o. female, , who presents for pelvic pain follow up, transvaginal ultrasound.      US performed today and normal results. These findings discussed and all questions answered.     The patient reports additional symptoms as none.    Since we last spoke, she is only intermittently having LLQ pelvic pain which she states resolves with dicyclomine.     Her last LMP was No LMP recorded. Patient is postmenopausal..  Periods are absent. Dysmenorrhea:mild, occurring randomly.  Patient reports problems with: none.  Partner Status: Marital Status: .  New Partners since last visit: no.  Desires STD Screening: no.    Additional OB/GYN History   Current contraception: contraceptive methods: None  Desires to: do not start contraception  Last Pap :   Last Completed Pap Smear       Status Date      PAP SMEAR Done 3/17/2021 PAP IG, HPV-HR ([facility])     Patient has more history with this topic...        History of abnormal Pap smear: no  Last mammogram: 2021 - results pending  Last Completed Mammogram       Status Date      MAMMOGRAM Done 3/15/2019 normal per pt        Tobacco Usage?: No   OB History        4    Para   4    Term   2            AB        Living   2       SAB        TAB        Ectopic        Molar        Multiple        Live Births   2                Health Maintenance   Topic Date Due   • ANNUAL PHYSICAL  Never done   • COVID-19 Vaccine (1) Never done   • TDAP/TD VACCINES (1 - Tdap) Never done   • ZOSTER VACCINE (1 of 2) Never done   • HEPATITIS C SCREENING  Never done   • MAMMOGRAM  03/15/2021   • LIPID PANEL  2021   • INFLUENZA VACCINE  2021   • Annual Gynecologic Pelvic and Breast Exam  2022   • PAP SMEAR  2024   • COLONOSCOPY  2029   • Pneumococcal Vaccine 0-64  Aged Out   • MENINGOCOCCAL VACCINE  Aged Out       The  "additional following portions of the patient's history were reviewed and updated as appropriate: allergies, current medications, past family history, past medical history, past social history, past surgical history and problem list.    Review of Systems   Constitutional: Negative for activity change, appetite change, chills, diaphoresis, fatigue, fever, unexpected weight gain and unexpected weight loss.   Respiratory: Negative for cough and shortness of breath.    Cardiovascular: Negative for chest pain and palpitations.   Gastrointestinal: Negative for abdominal distention, abdominal pain, vomiting and indigestion.   Genitourinary: Negative for menstrual problem and pelvic pain.   Neurological: Negative for light-headedness and headache.   Psychiatric/Behavioral: Negative for agitation, behavioral problems, decreased concentration and depressed mood.       I have reviewed and agree with the HPI, ROS, and historical information as entered above. Harley Petit MD    Objective   /84   Ht 154.9 cm (61\")   Wt 98.8 kg (217 lb 12.8 oz)   Breastfeeding No   BMI 41.15 kg/m²     Physical Exam  Vitals reviewed.   HENT:      Head: Normocephalic and atraumatic.   Cardiovascular:      Rate and Rhythm: Normal rate and regular rhythm.   Pulmonary:      Effort: Pulmonary effort is normal.      Breath sounds: Normal breath sounds.   Abdominal:      General: Abdomen is flat. Bowel sounds are normal.      Palpations: Abdomen is soft.   Skin:     General: Skin is warm and dry.   Neurological:      Mental Status: She is alert and oriented to person, place, and time.   Psychiatric:         Mood and Affect: Mood normal.         Behavior: Behavior normal.         Assessment/Plan     Assessment     Problem List Items Addressed This Visit        Genitourinary and Reproductive     Endometriosis            Plan     Return in about 1 year (around 4/9/2022) for Annual physical.  2.  Will continue dicyclomine as needed for pain. " If worsens, will reevaluate.       Harley Petit MD  04/09/2021

## 2021-04-09 NOTE — PATIENT INSTRUCTIONS
Pelvic Pain, Female  Pelvic pain is pain in your lower abdomen, below your belly button and between your hips. The pain may start suddenly (be acute), keep coming back (be recurring), or last a long time (become chronic). Pelvic pain that lasts longer than 6 months is considered chronic.  Pelvic pain may affect your:  · Reproductive organs.  · Urinary system.  · Digestive tract.  · Musculoskeletal system.  There are many potential causes of pelvic pain. Sometimes, the pain can be a result of digestive or urinary conditions, strained muscles or ligaments, or reproductive conditions. Sometimes the cause of pelvic pain is not known.  Follow these instructions at home:    · Take over-the-counter and prescription medicines only as told by your health care provider.  · Rest as told by your health care provider.  · Do not have sex if it hurts.  · Keep a journal of your pelvic pain. Write down:  ? When the pain started.  ? Where the pain is located.  ? What seems to make the pain better or worse, such as food or your period (menstrual cycle).  ? Any symptoms you have along with the pain.  · Keep all follow-up visits as told by your health care provider. This is important.  Contact a health care provider if:  · Medicine does not help your pain.  · Your pain comes back.  · You have new symptoms.  · You have abnormal vaginal discharge or bleeding, including bleeding after menopause.  · You have a fever or chills.  · You are constipated.  · You have blood in your urine or stool.  · You have foul-smelling urine.  · You feel weak or light-headed.  Get help right away if:  · You have sudden severe pain.  · Your pain gets steadily worse.  · You have severe pain along with fever, nausea, vomiting, or excessive sweating.  · You lose consciousness.  Summary  · Pelvic pain is pain in your lower abdomen, below your belly button and between your hips.  · There are many potential causes of pelvic pain.  · Keep a journal of your pelvic  pain.  This information is not intended to replace advice given to you by your health care provider. Make sure you discuss any questions you have with your health care provider.  Document Revised: 06/05/2019 Document Reviewed: 06/05/2019  Elsevier Patient Education © 2021 [employer]

## 2021-05-07 ENCOUNTER — APPOINTMENT (OUTPATIENT)
Dept: MAMMOGRAPHY | Facility: HOSPITAL | Age: 56
End: 2021-05-07

## 2021-08-30 DIAGNOSIS — I10 ESSENTIAL HYPERTENSION: ICD-10-CM

## 2021-08-30 RX ORDER — LOSARTAN POTASSIUM 100 MG/1
TABLET ORAL
Qty: 30 TABLET | Refills: 0 | Status: SHIPPED | OUTPATIENT
Start: 2021-08-30 | End: 2021-09-30 | Stop reason: SDUPTHER

## 2021-09-27 ENCOUNTER — TELEPHONE (OUTPATIENT)
Dept: NEUROLOGY | Facility: OTHER | Age: 56
End: 2021-09-27

## 2021-09-27 ENCOUNTER — TELEPHONE (OUTPATIENT)
Dept: NEUROSURGERY | Facility: CLINIC | Age: 56
End: 2021-09-27

## 2021-09-27 NOTE — TELEPHONE ENCOUNTER
**FYI**  PATIENT CALLED TO RESCHEDULE HER NEW PATIENT  APPT 10/04/21 WITH DR. ORTIZ DUE TO FINANCIAL ISSUES. PATIENT REQUESTED TO RESCHEDULE APPT FOR SOMETIME IN December. RESCHEDULED HER TO SEE HIM 12/02/21.

## 2021-09-30 DIAGNOSIS — I10 ESSENTIAL HYPERTENSION: ICD-10-CM

## 2021-09-30 RX ORDER — LOSARTAN POTASSIUM 100 MG/1
100 TABLET ORAL DAILY
Qty: 90 TABLET | Refills: 1 | Status: SHIPPED | OUTPATIENT
Start: 2021-09-30

## 2021-09-30 RX ORDER — HYDROCHLOROTHIAZIDE 25 MG/1
25 TABLET ORAL DAILY
Qty: 90 TABLET | Refills: 1 | Status: SHIPPED | OUTPATIENT
Start: 2021-09-30 | End: 2022-06-29

## 2021-12-02 ENCOUNTER — OFFICE VISIT (OUTPATIENT)
Dept: NEUROSURGERY | Facility: CLINIC | Age: 56
End: 2021-12-02

## 2021-12-02 VITALS
RESPIRATION RATE: 20 BRPM | HEIGHT: 61 IN | BODY MASS INDEX: 39.99 KG/M2 | WEIGHT: 211.8 LBS | DIASTOLIC BLOOD PRESSURE: 91 MMHG | HEART RATE: 72 BPM | SYSTOLIC BLOOD PRESSURE: 142 MMHG

## 2021-12-02 DIAGNOSIS — M48.02 FORAMINAL STENOSIS OF CERVICAL REGION: ICD-10-CM

## 2021-12-02 DIAGNOSIS — M50.30 DDD (DEGENERATIVE DISC DISEASE), CERVICAL: Primary | ICD-10-CM

## 2021-12-02 PROCEDURE — 99204 OFFICE O/P NEW MOD 45 MIN: CPT | Performed by: NEUROLOGICAL SURGERY

## 2021-12-02 NOTE — PATIENT INSTRUCTIONS
Start physical therapy.  Call us in January or February to give us an update on how you are doing.     [phone].
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
## 2021-12-09 ENCOUNTER — TELEPHONE (OUTPATIENT)
Dept: NEUROSURGERY | Facility: CLINIC | Age: 56
End: 2021-12-09

## 2021-12-09 DIAGNOSIS — M48.02 FORAMINAL STENOSIS OF CERVICAL REGION: ICD-10-CM

## 2021-12-09 DIAGNOSIS — M50.30 DDD (DEGENERATIVE DISC DISEASE), CERVICAL: Primary | ICD-10-CM

## 2021-12-09 NOTE — TELEPHONE ENCOUNTER
Caller: MAGUI    Relationship to patient: PHYSICAL THERAPY    Best call back number: [phone]    MAGUI IS CALLING TO GET THE ORDER FOR PHYSICAL THERAPY FAXED TO HER FOR THE PATIENT APPT ON 12/15/21    PLEASE FAX TO THE SAME NUMBER [phone]  THANK YOU

## 2022-02-08 NOTE — PROGRESS NOTES
"[facility] Cardiology      Identification: Umu Aburto is a 56 y.o. female who resides in [place], [place].    Reason for visit:  · Abnormal CAC  · Hypertension  · Hyperlipidemia      Subjective      Umu Aburto presents to [facility] for followup.    Patient returns for routine follow-up.  She has had a stable clinical course since her last visit.  She did have a cardiovascular concern recently after having one of her depression medications switched.  On the new medication, she felt a full body soreness, palpitations and anxiousness.  She thought it \"might be her circulation\".  Also since her last visit, the patient contracted Covid.            Review of Systems   Constitutional: Positive for malaise/fatigue.       Objective     /76 (BP Location: Right arm, Patient Position: Sitting)   Pulse 83   Ht 154.9 cm (61\")   Wt 94.8 kg (209 lb)   SpO2 98%   BMI 39.49 kg/m²       Constitutional:       Appearance: Healthy appearance. Obese.   Eyes:      General: Lids are normal. No scleral icterus.  HENT:      Head: Normocephalic and atraumatic.   Neck:      Thyroid: No thyroid mass.      Vascular: No carotid bruit or JVD. JVD normal.   Pulmonary:      Effort: Pulmonary effort is normal.      Breath sounds: Normal breath sounds.   Cardiovascular:      Normal rate. Regular rhythm.      Murmurs: There is no murmur.      No gallop.   Musculoskeletal:      Extremities: No clubbing present.Skin:     General: Skin is warm and dry. There is no cyanosis.   Neurological:      General: No focal deficit present.      Mental Status: Alert.   Psychiatric:         Attention and Perception: Attention normal.         Behavior: Behavior normal. Behavior is cooperative.         Result Review : No new laboratory studies to review.            Assessment     Problem List Items Addressed This Visit        Cardiology Problems    Coronary artery disease involving native coronary artery of native heart with angina " pectoris (HCC) - Primary (Chronic)    Overview     · [facility] admission for chest pain with negative biomarkers, 5/6/2018  · Exercise nuclear stress (5/7/18): Exercise for 5 minutes. Mild to moderate partially reversible mid anterior and apical wall defect. LVEF 63%  · Echo (5/7/18): LVEF 60%. Mild LVH. No significant valvular abnormality  · Presentation to [facility] 6/24/2019 with chest pain and ruled out for AMI/ACS/PE with negative biomarkers, negative CT and nonspecific EKG  · Nuclear stress test (7/19/2019): No ischemia/infarct.  · CAC score 84, 12/2020         Current Assessment & Plan     · No anginal symptoms  · Continue low-dose aspirin and statin therapy         Relevant Medications    aspirin (aspirin) 81 MG EC tablet    nitroglycerin (NITROSTAT) 0.4 MG SL tablet    Essential hypertension (Chronic)    Overview     • Target blood pressure <130/80 mmHg         Current Assessment & Plan     · Controlled  · Continue present medical therapy         Hyperlipidemia LDL goal <100 (Chronic)    Overview     · CAC score 84, 8/2020         Current Assessment & Plan     · Continue statin therapy due to abnormal coronary calcium score         Relevant Medications    atorvastatin (LIPITOR) 40 MG tablet       Other    Morbidly obese (HCC) (Chronic)    Current Assessment & Plan     · Obesity contributing to shortness of breath, hypertension  · Weight loss recommended               Plan   • Continue present medical therapy      Follow-up   · As needed      Andreas Rutledge MD, FACC, [facility]  2/9/2022

## 2022-02-09 ENCOUNTER — OFFICE VISIT (OUTPATIENT)
Dept: CARDIOLOGY | Facility: CLINIC | Age: 57
End: 2022-02-09

## 2022-02-09 VITALS
OXYGEN SATURATION: 98 % | HEART RATE: 83 BPM | HEIGHT: 61 IN | BODY MASS INDEX: 39.46 KG/M2 | SYSTOLIC BLOOD PRESSURE: 130 MMHG | WEIGHT: 209 LBS | DIASTOLIC BLOOD PRESSURE: 76 MMHG

## 2022-02-09 DIAGNOSIS — I10 ESSENTIAL HYPERTENSION: ICD-10-CM

## 2022-02-09 DIAGNOSIS — E78.5 HYPERLIPIDEMIA LDL GOAL <100: ICD-10-CM

## 2022-02-09 DIAGNOSIS — E66.01 MORBIDLY OBESE: ICD-10-CM

## 2022-02-09 DIAGNOSIS — I25.119 CORONARY ARTERY DISEASE INVOLVING NATIVE CORONARY ARTERY OF NATIVE HEART WITH ANGINA PECTORIS: Primary | ICD-10-CM

## 2022-02-09 PROCEDURE — 99213 OFFICE O/P EST LOW 20 MIN: CPT | Performed by: INTERNAL MEDICINE

## 2022-02-09 RX ORDER — NITROGLYCERIN 0.4 MG/1
0.4 TABLET SUBLINGUAL
Qty: 25 TABLET | Refills: 5 | Status: SHIPPED | OUTPATIENT
Start: 2022-02-09

## 2022-02-09 RX ORDER — ATORVASTATIN CALCIUM 40 MG/1
40 TABLET, FILM COATED ORAL NIGHTLY
Qty: 90 TABLET | Refills: 3 | Status: SHIPPED | OUTPATIENT
Start: 2022-02-09

## 2022-02-09 RX ORDER — ASPIRIN 81 MG/1
81 TABLET ORAL DAILY
Qty: 90 TABLET | Refills: 3 | Status: SHIPPED | OUTPATIENT
Start: 2022-02-09

## 2022-02-09 RX ORDER — ATORVASTATIN CALCIUM 40 MG/1
40 TABLET, FILM COATED ORAL DAILY
COMMUNITY
Start: 2021-12-01 | End: 2022-02-09 | Stop reason: SDUPTHER

## 2022-02-09 RX ORDER — ZOLPIDEM TARTRATE 10 MG/1
10 TABLET ORAL NIGHTLY
COMMUNITY
Start: 2022-01-19

## 2022-02-18 ENCOUNTER — OFFICE VISIT (OUTPATIENT)
Dept: OBSTETRICS AND GYNECOLOGY | Facility: CLINIC | Age: 57
End: 2022-02-18

## 2022-02-18 VITALS — SYSTOLIC BLOOD PRESSURE: 140 MMHG | DIASTOLIC BLOOD PRESSURE: 84 MMHG | WEIGHT: 210 LBS | BODY MASS INDEX: 39.68 KG/M2

## 2022-02-18 DIAGNOSIS — R30.0 DYSURIA: ICD-10-CM

## 2022-02-18 DIAGNOSIS — Z11.3 SCREENING EXAMINATION FOR STD (SEXUALLY TRANSMITTED DISEASE): Primary | ICD-10-CM

## 2022-02-18 LAB
BILIRUB BLD-MCNC: NEGATIVE MG/DL
CLARITY, POC: CLEAR
COLOR UR: YELLOW
EXPIRATION DATE: 0
GLUCOSE UR STRIP-MCNC: NEGATIVE MG/DL
KETONES UR QL: NEGATIVE
KOH PREP NAIL: NORMAL
LEUKOCYTE EST, POC: NEGATIVE
Lab: 0
NITRITE UR-MCNC: NEGATIVE MG/ML
PH UR: 6 [PH] (ref 5–8)
PROT UR STRIP-MCNC: NEGATIVE MG/DL
RBC # UR STRIP: NEGATIVE /UL
SP GR UR: 1.01 (ref 1–1.03)
UROBILINOGEN UR QL: NORMAL

## 2022-02-18 PROCEDURE — 99214 OFFICE O/P EST MOD 30 MIN: CPT | Performed by: OBSTETRICS & GYNECOLOGY

## 2022-02-18 PROCEDURE — 81003 URINALYSIS AUTO W/O SCOPE: CPT | Performed by: OBSTETRICS & GYNECOLOGY

## 2022-02-18 PROCEDURE — 87220 TISSUE EXAM FOR FUNGI: CPT | Performed by: OBSTETRICS & GYNECOLOGY

## 2022-02-18 RX ORDER — FLUCONAZOLE 100 MG/1
100 TABLET ORAL
Qty: 3 TABLET | Refills: 1 | Status: SHIPPED | OUTPATIENT
Start: 2022-02-18 | End: 2022-06-29

## 2022-02-18 RX ORDER — NITROFURANTOIN 25; 75 MG/1; MG/1
100 CAPSULE ORAL 2 TIMES DAILY
Qty: 14 CAPSULE | Refills: 0 | Status: SHIPPED | OUTPATIENT
Start: 2022-02-18 | End: 2022-02-25

## 2022-02-18 RX ORDER — FLUOXETINE HYDROCHLORIDE 40 MG/1
CAPSULE ORAL
COMMUNITY
Start: 2022-02-10

## 2022-02-18 NOTE — PATIENT INSTRUCTIONS

## 2022-02-18 NOTE — PROGRESS NOTES
Chief Complaint   Patient presents with   • Follow-up     STD screening   dysuria    Subjective   HPI  Umu Aburto is a 56 y.o. female, , who presents for STD screening.      Her last LMP was No LMP recorded. Patient is postmenopausal..  Patient is postmenopausal.  Patient reports problems with: vaginal burning, urinary frequency, and lower back pain.  Partner Status: Marital Status: .  New Partners since last visit: yes.  Desires STD Screening: yes.    Urine dip reviewed and no clear evidence of UTI, but will treat based on symptoms.     KOH/Wet prep performed and reviewed today.   Additional OB/GYN History   Current contraception: contraceptive methods: Post menopausal status  Desires to: do not start contraception  Last Pap :   Last Completed Pap Smear          PAP SMEAR (Every 3 Years) Next due on 3/17/2024    2021  Pap IG, HPV-hr    2019  Done - neg per pt              History of abnormal Pap smear: no  Last mammogram:   Last Completed Mammogram          Ordered - MAMMOGRAM (Every 2 Years) Ordered on 2021  Mammo Screening Digital Tomosynthesis Bilateral With CAD    03/15/2019  Done - normal per pt              Tobacco Usage?: No   OB History        4    Para   4    Term   2            AB        Living   2       SAB        IAB        Ectopic        Molar        Multiple        Live Births   2                Health Maintenance   Topic Date Due   • ANNUAL PHYSICAL  Never done   • COVID-19 Vaccine (1) Never done   • TDAP/TD VACCINES (1 - Tdap) Never done   • ZOSTER VACCINE (1 of 2) Never done   • HEPATITIS C SCREENING  Never done   • LIPID PANEL  2021   • INFLUENZA VACCINE  Never done   • Annual Gynecologic Pelvic and Breast Exam  2022   • MAMMOGRAM  2023   • PAP SMEAR  2024   • COLORECTAL CANCER SCREENING  2029   • Pneumococcal Vaccine 0-64  Aged Out       The additional following portions of the patient's history were  reviewed and updated as appropriate: allergies and current medications.    Review of Systems   Constitutional: Negative.    HENT: Negative.    Eyes: Negative.    Respiratory: Negative.    Cardiovascular: Negative.    Gastrointestinal: Negative.    Endocrine: Negative.    Genitourinary: Positive for dysuria, vaginal discharge and vaginal pain.   Musculoskeletal: Negative.    Skin: Negative.    Allergic/Immunologic: Negative.    Neurological: Negative.    Hematological: Negative.    Psychiatric/Behavioral: Negative.        I have reviewed and agree with the HPI, ROS, and historical information as entered above. Harley Petit MD    Objective   /84   Wt 95.3 kg (210 lb)   BMI 39.68 kg/m²     Physical Exam  Vitals reviewed. Exam conducted with a chaperone present.   Constitutional:       Appearance: Normal appearance.   HENT:      Head: Normocephalic and atraumatic.   Abdominal:      General: Abdomen is flat. Bowel sounds are normal.      Palpations: Abdomen is soft.   Genitourinary:     General: Normal vulva.      Vagina: Normal.   Skin:     General: Skin is warm and dry.   Neurological:      Mental Status: She is alert and oriented to person, place, and time.   Psychiatric:         Mood and Affect: Mood normal.         Behavior: Behavior normal.         Assessment/Plan     Assessment     Problem List Items Addressed This Visit     None      Visit Diagnoses     Screening examination for STD (sexually transmitted disease)    -  Primary    Relevant Orders    Chlamydia trachomatis, Neisseria gonorrhoeae, Trichomonas vaginalis, PCR - Swab, Cervix    Hepatitis B Surface Antigen    Hepatitis C Antibody    HIV-1 / O / 2 Ag / Antibody 4th Generation    RPR    Dysuria        Relevant Orders    POC Urinalysis Dipstick, Automated (Completed)    POC KOH Prep (Completed)          1. Dysuria  2. STD screening    Plan     Return in about 2 months (around 4/18/2022) for Annual physical.  1. Will treat for suspected UTI.  Cervical swab sent today.      Harley Petit MD  02/18/2022

## 2022-02-19 LAB
HBV SURFACE AG SERPL QL IA: NEGATIVE
HCV AB S/CO SERPL IA: <0.1 S/CO RATIO (ref 0–0.9)
HIV 1+2 AB+HIV1 P24 AG SERPL QL IA: NON REACTIVE
RPR SER QL: NON REACTIVE

## 2022-02-21 LAB
C TRACH RRNA SPEC QL NAA+PROBE: NEGATIVE
N GONORRHOEA RRNA SPEC QL NAA+PROBE: NEGATIVE
T VAGINALIS DNA SPEC QL NAA+PROBE: NEGATIVE

## 2022-02-23 ENCOUNTER — TELEPHONE (OUTPATIENT)
Dept: OBSTETRICS AND GYNECOLOGY | Facility: CLINIC | Age: 57
End: 2022-02-23

## 2022-02-23 NOTE — TELEPHONE ENCOUNTER
Patient left a message wanting to speak with a nurse about some test results that she noticed had come back in her mychart

## 2022-03-07 ENCOUNTER — TELEPHONE (OUTPATIENT)
Dept: NEUROSURGERY | Facility: CLINIC | Age: 57
End: 2022-03-07

## 2022-03-07 NOTE — TELEPHONE ENCOUNTER
"MAGUI CALLED BACK TO ADVISE IT IS NOT THE PT ORDER THEY NEED, BUT THE PATIENT'S SIGNED PLAN OF CARE. PLEASE FAX \"THERAPIES - SCAN\" (FOUND UNDER MEDIA) -[phone].  "

## 2022-03-07 NOTE — TELEPHONE ENCOUNTER
"  Caller: MAGUI    Relationship: PHYSICAL THERAPY PROVIDER    Best call back number: [phone]    What form or medical record are you requesting: THERAPY     Who is requesting this form or medical record from you: \"THERAPIES\" 1/25/22 LOCATED IN MEDIA    How would you like to receive the form or medical records (pick-up, mail, fax): FAX   If fax, what is the fax number: [phone] (PHONE AND FAX SAME #)  ([email or web address])    Timeframe paperwork needed: ASAP    Additional notes: MATTHEW KILGORE HAVE NOT RECEIVED SIGNED COPY.  PLEASE REFAX.    THANK YOU        "

## 2022-04-11 NOTE — PROGRESS NOTES
GYN Annual Exam     CC - Here for annual exam.   Pelvic pain     HPI  Umu Aburto is a 55 y.o. female, , who presents for annual well woman exam.  She is postmenopausal. She is  and sexually active. She has a history of LLQ pelvic pain for the last 3 years.  She brought records today including operative images which I reviewed. It appears as though she has stage IV endometriosis. These results discussed and all questions answered. She denies alleviating or worsening factors. She denies other symptoms. She describes the pain as intermittent and dull.     She is due for mammogram and order placed today.  She is due for pap smear. She is up to date on age appropriate labs.     Additional OB/GYN History   Current contraception: contraceptive methods: Post menopausal status  Desires to: continue contraception  On HRT? No  Last Pap :   Last Completed Pap Smear       Status Date      PAP SMEAR Done 3/1/2019 neg per pt        History of abnormal Pap smear: no  Family history of uterine, colon, breast, or ovarian cancer: no  Performs monthly Self-Breast Exam: yes  Last mammogram:   Last Completed Mammogram       Status Date      MAMMOGRAM Done 3/15/2019 normal per pt        Last colonoscopy:   Last Completed Colonoscopy       Status Date      COLONOSCOPY Done 2019 normal per pt        Last DEXA: Unknown date and results were Normal  Exercises Regularly: no  Feelings of Anxiety or Depression: no      Tobacco Usage?: No   OB History        2    Para   2    Term                AB        Living   2       SAB        TAB        Ectopic        Molar        Multiple        Live Births   2                Health Maintenance   Topic Date Due   • Annual Gynecologic Pelvic and Breast Exam  Never done   • ANNUAL PHYSICAL  Never done   • TDAP/TD VACCINES (1 - Tdap) Never done   • ZOSTER VACCINE (1 of 2) Never done   • HEPATITIS C SCREENING  Never done   • INFLUENZA VACCINE  Never done   • MAMMOGRAM   "03/15/2021   • LIPID PANEL  07/22/2021   • PAP SMEAR  03/01/2022   • COLONOSCOPY  01/01/2029   • Pneumococcal Vaccine 0-64  Aged Out   • MENINGOCOCCAL VACCINE  Aged Out       The additional following portions of the patient's history were reviewed and updated as appropriate: allergies, current medications, past family history, past medical history, past social history, past surgical history and problem list.    Review of Systems   Constitutional: Negative.  Negative for activity change, appetite change, unexpected weight gain and unexpected weight loss.   HENT: Negative.    Eyes: Negative.  Negative for visual disturbance.   Respiratory: Negative.  Negative for cough and shortness of breath.    Cardiovascular: Negative.  Negative for chest pain and palpitations.   Gastrointestinal: Negative.  Negative for abdominal distention, abdominal pain, nausea and vomiting.   Endocrine: Negative.  Negative for cold intolerance, heat intolerance, polydipsia, polyphagia and polyuria.   Genitourinary: Positive for pelvic pain (LLQ pain). Negative for breast discharge, breast lump, breast pain, dyspareunia, dysuria, menstrual problem, pelvic pressure, urinary incontinence, vaginal bleeding, vaginal discharge and vaginal pain.   Musculoskeletal: Negative.  Negative for back pain.   Skin: Negative.    Allergic/Immunologic: Negative.    Neurological: Negative.  Negative for light-headedness.   Hematological: Negative.    Psychiatric/Behavioral: Negative.  Negative for behavioral problems.     All other systems reviewed and are negative.     I have reviewed and agree with the HPI, ROS, and historical information as entered above. Harley Petit MD    Objective   /82   Ht 154.9 cm (61\")   Wt 103 kg (226 lb)   BMI 42.70 kg/m²     Physical Exam  Vitals reviewed. Exam conducted with a chaperone present.   Constitutional:       Appearance: Normal appearance. She is normal weight.   HENT:      Head: Normocephalic and " atraumatic.   Cardiovascular:      Rate and Rhythm: Normal rate and regular rhythm.   Pulmonary:      Effort: Pulmonary effort is normal.      Breath sounds: Normal breath sounds.   Chest:      Breasts:         Right: Normal.     Abdominal:      General: Abdomen is flat. Bowel sounds are normal.      Palpations: Abdomen is soft.   Genitourinary:     General: Normal vulva.      Vagina: Normal.      Cervix: Normal.      Uterus: Normal.       Adnexa: Right adnexa normal and left adnexa normal.      Rectum: Normal.   Musculoskeletal:      Cervical back: Neck supple.   Skin:     General: Skin is warm and dry.   Neurological:      Mental Status: She is alert and oriented to person, place, and time.   Psychiatric:         Mood and Affect: Mood normal.         Behavior: Behavior normal.            Assessment and Plan    Problem List Items Addressed This Visit        Endocrine and Metabolic    Morbidly obese (CMS/HCC)       Genitourinary and Reproductive     Endometriosis    Relevant Orders    US Non-ob Transvaginal      Other Visit Diagnoses     Women's annual routine gynecological examination    -  Primary    Relevant Orders    Pap IG, HPV-hr    Mammo Screening Digital Tomosynthesis Bilateral With CAD          1. GYN annual well woman exam.   2. Reviewed monthly self breast exams.  Instructed to call with lumps, pain, or breast discharge.  Yearly mammograms ordered.  3. Ordered mammogram today.  4. Recommended use of Vitamin D and getting adequate calcium in her diet. (1500mg)  5. Reviewed exercise as a preventative health measures.   6. Reviewed BMI and weight loss as preventative health measures.   7. Symptoms of menopausal transition reviewed with patient.   8. Reccommended Flu Vaccine in Fall of each year.  9. Other: Will f/u in 1 month for reevaluation and pelvic US.     Harley Petit MD  03/12/2021             none

## 2022-06-29 ENCOUNTER — OFFICE VISIT (OUTPATIENT)
Dept: OBSTETRICS AND GYNECOLOGY | Facility: CLINIC | Age: 57
End: 2022-06-29

## 2022-06-29 VITALS
DIASTOLIC BLOOD PRESSURE: 80 MMHG | HEIGHT: 61 IN | BODY MASS INDEX: 40.22 KG/M2 | WEIGHT: 213 LBS | SYSTOLIC BLOOD PRESSURE: 114 MMHG

## 2022-06-29 DIAGNOSIS — Z01.419 WOMEN'S ANNUAL ROUTINE GYNECOLOGICAL EXAMINATION: Primary | ICD-10-CM

## 2022-06-29 PROCEDURE — 99396 PREV VISIT EST AGE 40-64: CPT | Performed by: OBSTETRICS & GYNECOLOGY

## 2022-06-29 RX ORDER — DICYCLOMINE HYDROCHLORIDE 10 MG/1
10 CAPSULE ORAL 3 TIMES DAILY PRN
Qty: 30 CAPSULE | Refills: 3 | Status: SHIPPED | OUTPATIENT
Start: 2022-06-29 | End: 2023-06-29

## 2022-06-29 RX ORDER — FLUOXETINE HYDROCHLORIDE 20 MG/1
20 CAPSULE ORAL DAILY
COMMUNITY
Start: 2022-05-28 | End: 2022-06-29

## 2022-06-29 RX ORDER — TOPIRAMATE 25 MG/1
25 TABLET ORAL DAILY
COMMUNITY
Start: 2022-05-20

## 2022-06-29 RX ORDER — OMEPRAZOLE 40 MG/1
CAPSULE, DELAYED RELEASE ORAL
COMMUNITY
Start: 2022-05-17

## 2022-06-29 NOTE — PROGRESS NOTES
GYN Annual Exam     CC - Here for annual exam.     Subjective   HPI  Umu Aburto is a 57 y.o. female, , who presents for annual well woman exam.  She is postmenopausal. Her last LMP was No LMP recorded. Patient is postmenopausal. Patient reports problems with: LLQ pain. Unsure is r/t colon. She reports increased abdominal cramping and diarrhea for 8 months. She has hx of endometriosis with scar tissue on colon. She also noticed L breast tenderness with palpation. Partner Status: Marital Status: .  New Partners since last visit: YES/NO/Other: no.  Desires STD Screening: YES/NO/Other: no.    Additional OB/GYN History   Current contraception: contraceptive methods: Post menopausal status  Desires to: do not start contraception  Last Pap : 3/17/2021 negative, HPV negative  Last Completed Pap Smear          PAP SMEAR (Every 3 Years) Next due on 3/17/2024    2021  Pap IG, HPV-hr    2019  Done - neg per pt              History of abnormal Pap smear: no  Family history of uterine, colon, breast, or ovarian cancer: no  Performs monthly Self-Breast Exam: yes - yes, tenderness on L breast with palpation occasionally  Last mammogram: 2021  Last Completed Mammogram          Scheduled - MAMMOGRAM (Every 2 Years) Scheduled for 2021  Mammo Screening Digital Tomosynthesis Bilateral With CAD    03/15/2019  Done - normal per pt    2017  Mammo Screening Digital Tomosynthesis Bilateral With CAD    2016  Mammo Screening Digital Tomosynthesis Bilateral With CAD              Last colonoscopy:   Last Completed Colonoscopy          COLORECTAL CANCER SCREENING (COLONOSCOPY - Every 10 Years) Next due on 2019  COLONOSCOPY (Done - normal per pt)              Last DEXA: 6-7 years ago per pt   Parental Hip Fracture: no  Exercises Regularly: no  Feelings of Anxiety or Depression: no    Tobacco Usage?: No   OB History        4    Para   4    Term   2     "        AB        Living   2       SAB        IAB        Ectopic        Molar        Multiple        Live Births   2                Health Maintenance   Topic Date Due   • ANNUAL PHYSICAL  Never done   • TDAP/TD VACCINES (1 - Tdap) Never done   • ZOSTER VACCINE (1 of 2) Never done   • LIPID PANEL  2021   • Annual Gynecologic Pelvic and Breast Exam  2022   • COVID-19 Vaccine (3 - Booster for Moderna series) 2022   • INFLUENZA VACCINE  10/01/2022   • MAMMOGRAM  2023   • PAP SMEAR  2024   • COLORECTAL CANCER SCREENING  2029   • HEPATITIS C SCREENING  Completed   • Pneumococcal Vaccine 0-64  Aged Out       The additional following portions of the patient's history were reviewed and updated as appropriate: allergies, current medications, past family history, past medical history, past social history, past surgical history and problem list.    Review of Systems   Constitutional: Negative.    HENT: Negative.    Eyes: Negative.    Respiratory: Negative.    Cardiovascular: Negative.    Gastrointestinal: Positive for abdominal pain and diarrhea.   Endocrine: Negative.    Genitourinary: Negative.  Positive for breast pain.   Musculoskeletal: Negative.    Skin: Negative.    Allergic/Immunologic: Negative.    Neurological: Negative.    Hematological: Negative.    Psychiatric/Behavioral: Negative.        I have reviewed and agree with the HPI, ROS, and historical information as entered above. Harley Petit MD    Objective   /80   Ht 154.9 cm (61\")   Wt 96.6 kg (213 lb)   BMI 40.25 kg/m²     Physical Exam  Vitals reviewed. Exam conducted with a chaperone present.   Constitutional:       Appearance: Normal appearance. She is normal weight.   HENT:      Head: Normocephalic and atraumatic.   Cardiovascular:      Rate and Rhythm: Normal rate and regular rhythm.   Pulmonary:      Effort: Pulmonary effort is normal.      Breath sounds: Normal breath sounds.   Chest:   Breasts: "      Right: Normal.      Left: Normal.       Abdominal:      General: Abdomen is flat. Bowel sounds are normal.      Palpations: Abdomen is soft.   Genitourinary:     General: Normal vulva.      Vagina: Normal.      Cervix: Normal.      Uterus: Normal.       Adnexa: Right adnexa normal and left adnexa normal.   Musculoskeletal:      Cervical back: Neck supple.   Skin:     General: Skin is warm and dry.   Neurological:      Mental Status: She is alert and oriented to person, place, and time.   Psychiatric:         Mood and Affect: Mood normal.         Behavior: Behavior normal.         Assessment & Plan     Assessment     Problem List Items Addressed This Visit    None     Visit Diagnoses     Women's annual routine gynecological examination    -  Primary    Relevant Orders    Mammo Screening Digital Tomosynthesis Bilateral With CAD          1. GYN annual well woman exam.     Plan     1. Annual gynecologic exam performed today including breast and pelvic exam.  Pap smear is up-to-date.  Mammogram scheduled.  Age-appropriate labs up-to-date as well.  Preventive care information discussed and all questions answered.    Harley Petit MD  06/29/2022

## 2022-07-05 ENCOUNTER — APPOINTMENT (OUTPATIENT)
Dept: MAMMOGRAPHY | Facility: HOSPITAL | Age: 57
End: 2022-07-05

## 2022-07-30 ENCOUNTER — HOSPITAL ENCOUNTER (OUTPATIENT)
Dept: MAMMOGRAPHY | Facility: HOSPITAL | Age: 57
Discharge: HOME | End: 2022-07-30

## 2022-07-30 DIAGNOSIS — Z01.419 WOMEN'S ANNUAL ROUTINE GYNECOLOGICAL EXAMINATION: ICD-10-CM

## 2022-09-27 ENCOUNTER — TELEPHONE (OUTPATIENT)
Dept: OBSTETRICS AND GYNECOLOGY | Facility: CLINIC | Age: 57
End: 2022-09-27

## 2022-09-27 DIAGNOSIS — N64.4 MASTALGIA: Primary | ICD-10-CM

## 2022-09-27 NOTE — TELEPHONE ENCOUNTER
PT CALLING TO REQUEST ORDER FOR A DIAGNOSTIC MAMMOGRAM FOR BREAST PAIN AND HX OF ABNORMAL MAMMOGRAM. PT USES THE [facility] AT [place].

## 2022-09-27 NOTE — TELEPHONE ENCOUNTER
S/w pt and she states that when she was seen on 06/29/22 for her annual she had stated that she was having tenderness in her Left breast and also has a hx of abnormal Mammograms.   A screening mammogram was ordered at that time but when she went to have her mammogram in August the breast center told her she needed a Diagnostic mammogram ordered due to her hx of abnormal exams and her current pain in the Left breast.  I let her know that I would speak with Dr Petit and see if he could send in a corrected order. Pt v/u

## 2022-10-14 ENCOUNTER — HOSPITAL ENCOUNTER (OUTPATIENT)
Dept: ULTRASOUND IMAGING | Facility: HOSPITAL | Age: 57
Discharge: HOME | End: 2022-10-14

## 2022-10-14 ENCOUNTER — HOSPITAL ENCOUNTER (OUTPATIENT)
Dept: MAMMOGRAPHY | Facility: HOSPITAL | Age: 57
Discharge: HOME | End: 2022-10-14

## 2022-10-14 DIAGNOSIS — N64.4 MASTALGIA: ICD-10-CM

## 2022-10-14 PROCEDURE — 77066 DX MAMMO INCL CAD BI: CPT

## 2022-10-14 PROCEDURE — G0279 TOMOSYNTHESIS, MAMMO: HCPCS

## 2022-10-14 PROCEDURE — 76642 ULTRASOUND BREAST LIMITED: CPT

## 2022-10-14 PROCEDURE — 77062 BREAST TOMOSYNTHESIS BI: CPT | Performed by: RADIOLOGY

## 2022-10-14 PROCEDURE — 76642 ULTRASOUND BREAST LIMITED: CPT | Performed by: RADIOLOGY

## 2022-10-14 PROCEDURE — 77066 DX MAMMO INCL CAD BI: CPT | Performed by: RADIOLOGY

## 2023-01-01 NOTE — ASSESSMENT & PLAN NOTE
(2) more than 100 beats/min · Based on present lines for risk stratification, the patient does not appear to be and a statin benefit group.  · Further risk stratification--such as coronary calcium score testing--may be helpful if the patient ultimately defers cardiac catheterization

## 2024-01-11 DIAGNOSIS — R10.2 PELVIC PAIN: Primary | ICD-10-CM

## 2024-01-12 ENCOUNTER — OFFICE VISIT (OUTPATIENT)
Dept: OBSTETRICS AND GYNECOLOGY | Facility: CLINIC | Age: 59
End: 2024-01-12
Payer: COMMERCIAL

## 2024-01-12 VITALS — WEIGHT: 236 LBS | BODY MASS INDEX: 44.59 KG/M2 | SYSTOLIC BLOOD PRESSURE: 112 MMHG | DIASTOLIC BLOOD PRESSURE: 70 MMHG

## 2024-01-12 DIAGNOSIS — N83.202 LEFT OVARIAN CYST: ICD-10-CM

## 2024-01-12 DIAGNOSIS — N80.9 ENDOMETRIOSIS: Primary | ICD-10-CM

## 2024-01-12 DIAGNOSIS — Z12.31 SCREENING MAMMOGRAM FOR BREAST CANCER: ICD-10-CM

## 2024-01-12 DIAGNOSIS — R10.2 PELVIC PAIN: ICD-10-CM

## 2024-01-12 RX ORDER — DICYCLOMINE HYDROCHLORIDE 10 MG/1
20 CAPSULE ORAL 3 TIMES DAILY
Qty: 30 CAPSULE | Refills: 3 | Status: SHIPPED | OUTPATIENT
Start: 2024-01-12 | End: 2025-01-11
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
## 2024-02-20 ENCOUNTER — TELEPHONE (OUTPATIENT)
Dept: OBSTETRICS AND GYNECOLOGY | Facility: CLINIC | Age: 59
End: 2024-02-20
Payer: COMMERCIAL

## 2024-02-20 NOTE — TELEPHONE ENCOUNTER
Caller: Umu Aburto    Relationship: Self    Best call back number: [phone]    What orders are you requesting (i.e. lab or imaging): DIAGNOSTIC MAMMOGRAM    In what timeframe would the patient need to come in: ASAP    Where will you receive your lab/imaging services: AT [facility]    Additional notes: PT IS LOSING INSURANCE AFTER THE 29TH OF FEB.  STATES SHE NEEDS TO GET A DIAGNOSTIC MAMMO BECAUSE SHE HAS A SORE SPOT THAT HAS BEEN THERE A WHILE.    PLEASE CALL TO ADVISE/DISCUSS

## 2024-02-20 NOTE — TELEPHONE ENCOUNTER
Returned patient's call. States her insurance will be changing as of 2/29/24 and will have a 30-45 day gap in coverage until Medicaid is available. Her last mammogram was done 10/14/2022; it was diagnostic with an ultrasound due to tenderness in left breast. Result was normal with recommendation to return to routine screening in one year. She states she has continued to have that same tenderness in the left breast since then and would like to have an order for a diagnostic mammogram if that will be needed so she can get it done before her current insurance expires. Dr. Petit ordered a screening mammogram when he saw her on 1/12/24. She has not scheduled it yet. She states the tenderness in left breast is unchanged from before the previous mammogram. Advised her to schedule screening mammogram as ordered. She v/u.

## 2024-02-23 ENCOUNTER — OFFICE VISIT (OUTPATIENT)
Dept: OBSTETRICS AND GYNECOLOGY | Facility: CLINIC | Age: 59
End: 2024-02-23
Payer: COMMERCIAL

## 2024-02-23 VITALS
HEIGHT: 61 IN | BODY MASS INDEX: 44.25 KG/M2 | WEIGHT: 234.4 LBS | SYSTOLIC BLOOD PRESSURE: 116 MMHG | DIASTOLIC BLOOD PRESSURE: 68 MMHG

## 2024-02-23 DIAGNOSIS — N83.202 LEFT OVARIAN CYST: ICD-10-CM

## 2024-02-23 DIAGNOSIS — Z01.419 WOMEN'S ANNUAL ROUTINE GYNECOLOGICAL EXAMINATION: Primary | ICD-10-CM

## 2024-02-23 DIAGNOSIS — N80.9 ENDOMETRIOSIS: ICD-10-CM

## 2024-02-23 RX ORDER — GABAPENTIN 600 MG/1
300 TABLET ORAL 2 TIMES DAILY
COMMUNITY
Start: 2024-01-26

## 2024-02-23 RX ORDER — DOCUSATE SODIUM 250 MG
250 CAPSULE ORAL DAILY
COMMUNITY
Start: 2024-01-09

## 2024-02-23 RX ORDER — FLUTICASONE PROPIONATE 50 MCG
1 SPRAY, SUSPENSION (ML) NASAL DAILY
COMMUNITY
Start: 2024-01-29

## 2024-02-23 RX ORDER — HYDROCHLOROTHIAZIDE 25 MG/1
25 TABLET ORAL DAILY
COMMUNITY
Start: 2023-12-16

## 2024-02-23 RX ORDER — HYDROXYZINE HYDROCHLORIDE 25 MG/1
25 TABLET, FILM COATED ORAL EVERY 4 HOURS PRN
COMMUNITY
Start: 2024-01-16

## 2024-02-23 RX ORDER — POLYETHYLENE GLYCOL 3350 17 G/17G
17 POWDER, FOR SOLUTION ORAL DAILY
COMMUNITY
Start: 2024-01-11
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
## 2024-02-24 LAB — CANCER AG125 SERPL-ACNC: 49.6 U/ML (ref 0–38.1)

## 2024-02-26 DIAGNOSIS — N83.202 LEFT OVARIAN CYST: Primary | ICD-10-CM

## 2024-02-26 DIAGNOSIS — R97.1 ELEVATED CA-125: ICD-10-CM

## 2024-02-28 DIAGNOSIS — N83.202 LEFT OVARIAN CYST: Primary | ICD-10-CM

## 2024-02-28 DIAGNOSIS — R97.1 ELEVATED CA-125: ICD-10-CM

## 2024-02-29 LAB — REF LAB TEST METHOD: NORMAL

## 2024-06-28 ENCOUNTER — TRANSCRIBE ORDERS (OUTPATIENT)
Dept: ADMINISTRATIVE | Facility: HOSPITAL | Age: 59
End: 2024-06-28

## 2024-06-28 DIAGNOSIS — N64.4 MASTODYNIA: Primary | ICD-10-CM

## 2024-07-15 ENCOUNTER — HOSPITAL ENCOUNTER (OUTPATIENT)
Facility: HOSPITAL | Age: 59
Discharge: HOME | End: 2024-07-15
Admitting: FAMILY MEDICINE
Payer: MEDICAID

## 2024-07-15 DIAGNOSIS — N64.4 MASTODYNIA: ICD-10-CM

## 2024-07-15 PROCEDURE — G0279 TOMOSYNTHESIS, MAMMO: HCPCS

## 2024-07-15 PROCEDURE — 77066 DX MAMMO INCL CAD BI: CPT

## 2024-12-12 NOTE — PROGRESS NOTES
Encounter Date:06/12/2018    Patient ID: Umu Aburto is a  53 y.o. female who resides in [place], [place].    CC/Reason for visit:  Abnormal cardiac testing (Consult ) and Chest Pain            Umu Aburto is referred to my office by for abnormal cardiac testing and chest tightness. She is a 53-year-old female with no cardiac history but risk factors including hypertension and family history of early coronary artery disease. Approximately one month ago, the patient experienced chest pain symptoms which occurred at rest. She was admitted to [facility] where she ruled out for myocardial infarction. She subsequently underwent nuclear stress testing which reported a small partially reversible anterior and apical defect. The patient did not receive much explanation as to what these results mean and therefore she is somewhat my opinion. She has had intermittent chest pain symptoms since her hospitalization which last several minutes and resolved either spontaneously or with nitroglycerin. Notably, not of her chest pains occur with exertion. The patient does complain, however, of a generalized fatigue which has gotten worse over the last several years. She mentions that she has been evaluated for autoimmune disorders in the past but presently does not carry a diagnosis other than polyarthralgia.      Review of Systems   Constitution: Positive for malaise/fatigue. Negative for weakness.   Eyes: Negative for vision loss in left eye and vision loss in right eye.   Cardiovascular: Positive for palpitations. Negative for chest pain, dyspnea on exertion, near-syncope, orthopnea, paroxysmal nocturnal dyspnea and syncope.   Respiratory: Positive for shortness of breath.    Skin: Positive for dry skin and itching.   Musculoskeletal: Positive for arthritis and muscle cramps. Negative for myalgias.   Gastrointestinal: Positive for constipation.   Neurological: Positive for excessive daytime sleepiness and  "headaches. Negative for brief paralysis, focal weakness, numbness and paresthesias.   Allergic/Immunologic: Positive for persistent infections.   All other systems reviewed and are negative.      The patient's past medical, social, family history and ROS reviewed in the patient's electronic medical record.    Allergies  Patient has no known allergies.    Outpatient Prescriptions Marked as Taking for the 6/12/18 encounter (Consult) with [place]a Rutledge IV, MD   Medication Sig Dispense Refill   • CODY LOW DOSE 81 MG EC tablet Daily.     • FLUoxetine (PROzac) 40 MG capsule Daily.     • levothyroxine (SYNTHROID, LEVOTHROID) 75 MCG tablet Take 75 mcg by mouth Daily.     • lisinopril-hydrochlorothiazide (PRINZIDE,ZESTORETIC) 10-12.5 MG per tablet Daily.     • nitroglycerin (NITROSTAT) 0.4 MG SL tablet As Needed.           Blood pressure 110/76, pulse 72, height 154.9 cm (61\"), weight 92.2 kg (203 lb 3.2 oz).  Body mass index is 38.39 kg/m².    Physical Exam   Constitutional: She is oriented to person, place, and time. She appears well-developed and well-nourished.   HENT:   Head: Normocephalic and atraumatic.   Eyes: Pupils are equal, round, and reactive to light. No scleral icterus.   Neck: No JVD present. Carotid bruit is not present. No thyromegaly present.   Cardiovascular: Normal rate and regular rhythm.  Exam reveals no gallop.    No murmur heard.  Pulmonary/Chest: Effort normal and breath sounds normal.   Abdominal: Soft. She exhibits no distension. There is no hepatosplenomegaly.   Musculoskeletal: She exhibits no edema.   Neurological: She is alert and oriented to person, place, and time.   Skin: Skin is warm and dry.   Psychiatric: She has a normal mood and affect. Her behavior is normal.       Data Review:       ECG 12 Lead  Date/Time: 6/12/2018 10:35 AM  Performed by: ARYA RUTLEDGE IV  Authorized by: ARYA RUTLEDGE IV   Rhythm: sinus rhythm  BPM: 72  Clinical impression: normal " ECG             Labs (5/6/18):   · Hemoglobin 14.2, platelets 369, white count 7, RBC 4.66, HCT 42.7  · Sodium 142, potassium 4.2, creatinine 0.8, BUN 11.0, glucose 121, AST 16, ALT 12  · Troponin negative ×3  · Hgb A1c 5.7  · , Trig 83, LDL 92, HDL 37  · TSH 1.85         Problem List Items Addressed This Visit        Cardiovascular and Mediastinum    Essential hypertension    Current Assessment & Plan     · Controlled  · Continue present medical therapy         Relevant Medications    lisinopril-hydrochlorothiazide (PRINZIDE,ZESTORETIC) 10-12.5 MG per tablet       Nervous and Auditory    Other chest pain - Primary    Overview     · [facility] admission for chest pain with negative biomarkers, 5/6/doesn't 18  · Exercise nuclear stress (5/7/18): Exercise for 5 minutes. Mild to moderate partially reversible mid anterior and apical wall defect. LVEF 63%  · Echo (5/7/18): LVEF 60%. Mild LVH. No significant valvular abnormality         Current Assessment & Plan     Patient's symptoms are atypical in nature in that they occur sporadically and without exertion. The fact that they do improve with nitroglycerin raises the possibility of obstructive disease, coronary vasospasm, or esophageal spasm. We discussed her stress test results. She had a small defect in the anterior apical wall which I told her may represent a small territory of ischemia and her myocardium, but also may represent breast attenuation given its location. I have offered the patient heart catheterization for definitive assessment versus continued monitoring and treatment with low-dose aspirin and as needed nitroglycerin. The patient would prefer to defer cardiac catheterization which I think is reasonable.            Other    Family history of early CAD    Overview     · Calculated 10 year risk of cardiovascular events is <2%         Current Assessment & Plan     · Based on present lines for risk stratification, the patient does not appear to be and a  statin benefit group.  · Further risk stratification--such as coronary calcium score testing--may be helpful if the patient ultimately defers cardiac catheterization                    · Defer cardiac catheterization for now  · Continue daily low-dose aspirin and sublingual nitroglycerin as needed  · Return in about 3 months (around 9/12/2018).   · If patient has worsening chest pain symptoms, cardiac catheterization will be reevaluated    [place]a Rutledge IV, MD  6/12/2018     Scribed for [place]a Rutledge IV, MD by Orville Covarrubias. 6/12/2018  4:45 PM     IMPROVE-DD Application Not Available